# Patient Record
Sex: MALE | Race: WHITE | NOT HISPANIC OR LATINO | Employment: FULL TIME
[De-identification: names, ages, dates, MRNs, and addresses within clinical notes are randomized per-mention and may not be internally consistent; named-entity substitution may affect disease eponyms.]

---

## 2017-01-19 ENCOUNTER — RECORDS - HEALTHEAST (OUTPATIENT)
Dept: ADMINISTRATIVE | Facility: OTHER | Age: 57
End: 2017-01-19

## 2017-04-04 ENCOUNTER — COMMUNICATION - HEALTHEAST (OUTPATIENT)
Dept: INTERNAL MEDICINE | Facility: CLINIC | Age: 57
End: 2017-04-04

## 2017-04-04 DIAGNOSIS — K57.92 DIVERTICULITIS: ICD-10-CM

## 2017-04-05 ENCOUNTER — RECORDS - HEALTHEAST (OUTPATIENT)
Dept: ADMINISTRATIVE | Facility: OTHER | Age: 57
End: 2017-04-05

## 2017-04-18 ENCOUNTER — COMMUNICATION - HEALTHEAST (OUTPATIENT)
Dept: INTERNAL MEDICINE | Facility: CLINIC | Age: 57
End: 2017-04-18

## 2017-04-18 DIAGNOSIS — M85.672 OTHER CYST OF BONE, LEFT ANKLE AND FOOT: ICD-10-CM

## 2017-05-07 ENCOUNTER — COMMUNICATION - HEALTHEAST (OUTPATIENT)
Dept: INTERNAL MEDICINE | Facility: CLINIC | Age: 57
End: 2017-05-07

## 2017-05-07 DIAGNOSIS — I10 HYPERTENSION: ICD-10-CM

## 2017-05-09 ENCOUNTER — OFFICE VISIT - HEALTHEAST (OUTPATIENT)
Dept: PODIATRY | Age: 57
End: 2017-05-09

## 2017-05-09 DIAGNOSIS — M72.2 PLANTAR FASCIAL FIBROMATOSIS OF LEFT FOOT: ICD-10-CM

## 2017-07-07 ENCOUNTER — COMMUNICATION - HEALTHEAST (OUTPATIENT)
Dept: INTERNAL MEDICINE | Facility: CLINIC | Age: 57
End: 2017-07-07

## 2017-07-07 DIAGNOSIS — I10 HYPERTENSION: ICD-10-CM

## 2017-07-13 ENCOUNTER — COMMUNICATION - HEALTHEAST (OUTPATIENT)
Dept: INTERNAL MEDICINE | Facility: CLINIC | Age: 57
End: 2017-07-13

## 2017-07-13 DIAGNOSIS — I10 HYPERTENSION: ICD-10-CM

## 2018-03-07 ENCOUNTER — OFFICE VISIT - HEALTHEAST (OUTPATIENT)
Dept: INTERNAL MEDICINE | Facility: CLINIC | Age: 58
End: 2018-03-07

## 2018-03-07 DIAGNOSIS — M89.8X1 CHRONIC SCAPULAR PAIN: ICD-10-CM

## 2018-03-07 DIAGNOSIS — M25.511 RIGHT SHOULDER PAIN: ICD-10-CM

## 2018-03-07 DIAGNOSIS — G89.29 CHRONIC SCAPULAR PAIN: ICD-10-CM

## 2018-03-07 DIAGNOSIS — I10 ESSENTIAL HYPERTENSION: ICD-10-CM

## 2018-03-07 DIAGNOSIS — F41.1 ANXIETY STATE: ICD-10-CM

## 2018-03-07 ASSESSMENT — MIFFLIN-ST. JEOR: SCORE: 1787.51

## 2018-04-04 ENCOUNTER — RECORDS - HEALTHEAST (OUTPATIENT)
Dept: ADMINISTRATIVE | Facility: OTHER | Age: 58
End: 2018-04-04

## 2018-04-16 ENCOUNTER — COMMUNICATION - HEALTHEAST (OUTPATIENT)
Dept: INTERNAL MEDICINE | Facility: CLINIC | Age: 58
End: 2018-04-16

## 2018-04-17 ENCOUNTER — AMBULATORY - HEALTHEAST (OUTPATIENT)
Dept: INTERNAL MEDICINE | Facility: CLINIC | Age: 58
End: 2018-04-17

## 2018-04-18 ENCOUNTER — OFFICE VISIT - HEALTHEAST (OUTPATIENT)
Dept: INTERNAL MEDICINE | Facility: CLINIC | Age: 58
End: 2018-04-18

## 2018-04-18 DIAGNOSIS — M75.101 ROTATOR CUFF TEAR, RIGHT: ICD-10-CM

## 2018-04-18 DIAGNOSIS — Z01.818 PREOP EXAMINATION: ICD-10-CM

## 2018-04-18 DIAGNOSIS — I10 ESSENTIAL HYPERTENSION: ICD-10-CM

## 2018-04-18 LAB
ANION GAP SERPL CALCULATED.3IONS-SCNC: 12 MMOL/L (ref 5–18)
ATRIAL RATE - MUSE: 59 BPM
BUN SERPL-MCNC: 18 MG/DL (ref 8–22)
CALCIUM SERPL-MCNC: 9.9 MG/DL (ref 8.5–10.5)
CHLORIDE BLD-SCNC: 106 MMOL/L (ref 98–107)
CO2 SERPL-SCNC: 25 MMOL/L (ref 22–31)
CREAT SERPL-MCNC: 1.02 MG/DL (ref 0.7–1.3)
DIASTOLIC BLOOD PRESSURE - MUSE: NORMAL MMHG
ERYTHROCYTE [DISTWIDTH] IN BLOOD BY AUTOMATED COUNT: 12.7 % (ref 11–14.5)
GFR SERPL CREATININE-BSD FRML MDRD: >60 ML/MIN/1.73M2
GLUCOSE BLD-MCNC: 67 MG/DL (ref 70–125)
HCT VFR BLD AUTO: 42.7 % (ref 40–54)
HGB BLD-MCNC: 14.1 G/DL (ref 14–18)
INTERPRETATION ECG - MUSE: NORMAL
MCH RBC QN AUTO: 31 PG (ref 27–34)
MCHC RBC AUTO-ENTMCNC: 33.1 G/DL (ref 32–36)
MCV RBC AUTO: 94 FL (ref 80–100)
P AXIS - MUSE: 56 DEGREES
PLATELET # BLD AUTO: 247 THOU/UL (ref 140–440)
PMV BLD AUTO: 8.6 FL (ref 7–10)
POTASSIUM BLD-SCNC: 3.9 MMOL/L (ref 3.5–5)
PR INTERVAL - MUSE: 170 MS
QRS DURATION - MUSE: 102 MS
QT - MUSE: 406 MS
QTC - MUSE: 401 MS
R AXIS - MUSE: 14 DEGREES
RBC # BLD AUTO: 4.56 MILL/UL (ref 4.4–6.2)
SODIUM SERPL-SCNC: 143 MMOL/L (ref 136–145)
SYSTOLIC BLOOD PRESSURE - MUSE: NORMAL MMHG
T AXIS - MUSE: 3 DEGREES
VENTRICULAR RATE- MUSE: 59 BPM
WBC: 6.7 THOU/UL (ref 4–11)

## 2018-04-18 ASSESSMENT — MIFFLIN-ST. JEOR: SCORE: 1832.87

## 2018-04-20 ENCOUNTER — RECORDS - HEALTHEAST (OUTPATIENT)
Dept: ADMINISTRATIVE | Facility: OTHER | Age: 58
End: 2018-04-20

## 2018-04-26 ENCOUNTER — COMMUNICATION - HEALTHEAST (OUTPATIENT)
Dept: INTERNAL MEDICINE | Facility: CLINIC | Age: 58
End: 2018-04-26

## 2018-04-26 DIAGNOSIS — I10 HYPERTENSION: ICD-10-CM

## 2018-05-04 ENCOUNTER — RECORDS - HEALTHEAST (OUTPATIENT)
Dept: ADMINISTRATIVE | Facility: OTHER | Age: 58
End: 2018-05-04

## 2018-06-18 ENCOUNTER — COMMUNICATION - HEALTHEAST (OUTPATIENT)
Dept: INTERNAL MEDICINE | Facility: CLINIC | Age: 58
End: 2018-06-18

## 2018-06-18 DIAGNOSIS — I10 HYPERTENSION: ICD-10-CM

## 2018-07-13 ENCOUNTER — RECORDS - HEALTHEAST (OUTPATIENT)
Dept: ADMINISTRATIVE | Facility: OTHER | Age: 58
End: 2018-07-13

## 2018-08-24 ENCOUNTER — RECORDS - HEALTHEAST (OUTPATIENT)
Dept: ADMINISTRATIVE | Facility: OTHER | Age: 58
End: 2018-08-24

## 2018-09-12 ENCOUNTER — COMMUNICATION - HEALTHEAST (OUTPATIENT)
Dept: INTERNAL MEDICINE | Facility: CLINIC | Age: 58
End: 2018-09-12

## 2018-09-12 ENCOUNTER — AMBULATORY - HEALTHEAST (OUTPATIENT)
Dept: INTERNAL MEDICINE | Facility: CLINIC | Age: 58
End: 2018-09-12

## 2018-09-12 DIAGNOSIS — R14.0 ABDOMINAL BLOATING: ICD-10-CM

## 2018-09-12 DIAGNOSIS — R10.30 LOWER ABDOMINAL PAIN: ICD-10-CM

## 2018-09-14 ENCOUNTER — RECORDS - HEALTHEAST (OUTPATIENT)
Dept: ADMINISTRATIVE | Facility: OTHER | Age: 58
End: 2018-09-14

## 2018-09-17 ENCOUNTER — HOSPITAL ENCOUNTER (OUTPATIENT)
Dept: CT IMAGING | Facility: HOSPITAL | Age: 58
Discharge: HOME | End: 2018-09-17
Attending: INTERNAL MEDICINE

## 2018-09-17 DIAGNOSIS — R10.32 LLQ PAIN: ICD-10-CM

## 2018-09-28 ENCOUNTER — RECORDS - HEALTHEAST (OUTPATIENT)
Dept: ADMINISTRATIVE | Facility: OTHER | Age: 58
End: 2018-09-28

## 2018-10-18 ENCOUNTER — COMMUNICATION - HEALTHEAST (OUTPATIENT)
Dept: SCHEDULING | Facility: CLINIC | Age: 58
End: 2018-10-18

## 2018-10-18 ENCOUNTER — OFFICE VISIT - HEALTHEAST (OUTPATIENT)
Dept: INTERNAL MEDICINE | Facility: CLINIC | Age: 58
End: 2018-10-18

## 2018-10-18 DIAGNOSIS — K64.4 EXTERNAL HEMORRHOIDS: ICD-10-CM

## 2018-10-22 ENCOUNTER — COMMUNICATION - HEALTHEAST (OUTPATIENT)
Dept: INTERNAL MEDICINE | Facility: CLINIC | Age: 58
End: 2018-10-22

## 2018-12-12 ENCOUNTER — RECORDS - HEALTHEAST (OUTPATIENT)
Dept: ADMINISTRATIVE | Facility: OTHER | Age: 58
End: 2018-12-12

## 2019-01-10 ENCOUNTER — OFFICE VISIT - HEALTHEAST (OUTPATIENT)
Dept: INTERNAL MEDICINE | Facility: CLINIC | Age: 59
End: 2019-01-10

## 2019-01-10 ENCOUNTER — COMMUNICATION - HEALTHEAST (OUTPATIENT)
Dept: INTERNAL MEDICINE | Facility: CLINIC | Age: 59
End: 2019-01-10

## 2019-01-10 DIAGNOSIS — R50.9 FEVER, UNSPECIFIED FEVER CAUSE: ICD-10-CM

## 2019-01-10 DIAGNOSIS — J06.9 VIRAL URI: ICD-10-CM

## 2019-01-10 DIAGNOSIS — R05.9 COUGH: ICD-10-CM

## 2019-01-10 LAB
FLUAV AG SPEC QL IA: NORMAL
FLUBV AG SPEC QL IA: NORMAL

## 2019-01-10 ASSESSMENT — MIFFLIN-ST. JEOR: SCORE: 1787.51

## 2019-08-09 ENCOUNTER — RECORDS - HEALTHEAST (OUTPATIENT)
Dept: ADMINISTRATIVE | Facility: OTHER | Age: 59
End: 2019-08-09

## 2019-08-19 ENCOUNTER — COMMUNICATION - HEALTHEAST (OUTPATIENT)
Dept: INTERNAL MEDICINE | Facility: CLINIC | Age: 59
End: 2019-08-19

## 2019-08-19 ENCOUNTER — AMBULATORY - HEALTHEAST (OUTPATIENT)
Dept: INTERNAL MEDICINE | Facility: CLINIC | Age: 59
End: 2019-08-19

## 2019-08-20 ENCOUNTER — COMMUNICATION - HEALTHEAST (OUTPATIENT)
Dept: INTERNAL MEDICINE | Facility: CLINIC | Age: 59
End: 2019-08-20

## 2019-08-20 DIAGNOSIS — I10 HYPERTENSION: ICD-10-CM

## 2019-08-29 ENCOUNTER — COMMUNICATION - HEALTHEAST (OUTPATIENT)
Dept: SCHEDULING | Facility: CLINIC | Age: 59
End: 2019-08-29

## 2019-08-29 ENCOUNTER — COMMUNICATION - HEALTHEAST (OUTPATIENT)
Dept: INTERNAL MEDICINE | Facility: CLINIC | Age: 59
End: 2019-08-29

## 2019-08-29 ENCOUNTER — OFFICE VISIT - HEALTHEAST (OUTPATIENT)
Dept: INTERNAL MEDICINE | Facility: CLINIC | Age: 59
End: 2019-08-29

## 2019-08-29 DIAGNOSIS — S46.002A ROTATOR CUFF INJURY, LEFT, INITIAL ENCOUNTER: ICD-10-CM

## 2019-08-29 ASSESSMENT — MIFFLIN-ST. JEOR: SCORE: 1769.37

## 2019-09-10 ENCOUNTER — OFFICE VISIT - HEALTHEAST (OUTPATIENT)
Dept: INTERNAL MEDICINE | Facility: CLINIC | Age: 59
End: 2019-09-10

## 2019-09-10 DIAGNOSIS — Z01.818 PREOPERATIVE EXAMINATION: ICD-10-CM

## 2019-09-10 DIAGNOSIS — I10 ESSENTIAL HYPERTENSION: ICD-10-CM

## 2019-09-10 DIAGNOSIS — S46.012S TRAUMATIC INCOMPLETE TEAR OF LEFT ROTATOR CUFF, SEQUELA: ICD-10-CM

## 2019-09-10 LAB
ANION GAP SERPL CALCULATED.3IONS-SCNC: 10 MMOL/L (ref 5–18)
ATRIAL RATE - MUSE: 61 BPM
BUN SERPL-MCNC: 15 MG/DL (ref 8–22)
CALCIUM SERPL-MCNC: 10.1 MG/DL (ref 8.5–10.5)
CHLORIDE BLD-SCNC: 105 MMOL/L (ref 98–107)
CO2 SERPL-SCNC: 25 MMOL/L (ref 22–31)
CREAT SERPL-MCNC: 1.09 MG/DL (ref 0.7–1.3)
DIASTOLIC BLOOD PRESSURE - MUSE: NORMAL MMHG
ERYTHROCYTE [DISTWIDTH] IN BLOOD BY AUTOMATED COUNT: 13 % (ref 11–14.5)
GFR SERPL CREATININE-BSD FRML MDRD: >60 ML/MIN/1.73M2
GLUCOSE BLD-MCNC: 91 MG/DL (ref 70–125)
HCT VFR BLD AUTO: 46.4 % (ref 40–54)
HGB BLD-MCNC: 15.2 G/DL (ref 14–18)
INTERPRETATION ECG - MUSE: NORMAL
MCH RBC QN AUTO: 31.5 PG (ref 27–34)
MCHC RBC AUTO-ENTMCNC: 32.8 G/DL (ref 32–36)
MCV RBC AUTO: 96 FL (ref 80–100)
P AXIS - MUSE: 64 DEGREES
PLATELET # BLD AUTO: 201 THOU/UL (ref 140–440)
PMV BLD AUTO: 9 FL (ref 7–10)
POTASSIUM BLD-SCNC: 4.1 MMOL/L (ref 3.5–5)
PR INTERVAL - MUSE: 172 MS
QRS DURATION - MUSE: 94 MS
QT - MUSE: 388 MS
QTC - MUSE: 390 MS
R AXIS - MUSE: 10 DEGREES
RBC # BLD AUTO: 4.84 MILL/UL (ref 4.4–6.2)
SODIUM SERPL-SCNC: 140 MMOL/L (ref 136–145)
SYSTOLIC BLOOD PRESSURE - MUSE: NORMAL MMHG
T AXIS - MUSE: 0 DEGREES
VENTRICULAR RATE- MUSE: 61 BPM
WBC: 5.3 THOU/UL (ref 4–11)

## 2019-09-10 ASSESSMENT — MIFFLIN-ST. JEOR: SCORE: 1777.87

## 2019-09-17 ENCOUNTER — RECORDS - HEALTHEAST (OUTPATIENT)
Dept: ADMINISTRATIVE | Facility: OTHER | Age: 59
End: 2019-09-17

## 2019-10-02 ENCOUNTER — RECORDS - HEALTHEAST (OUTPATIENT)
Dept: ADMINISTRATIVE | Facility: OTHER | Age: 59
End: 2019-10-02

## 2019-11-05 ENCOUNTER — COMMUNICATION - HEALTHEAST (OUTPATIENT)
Dept: SCHEDULING | Facility: CLINIC | Age: 59
End: 2019-11-05

## 2019-11-05 ENCOUNTER — OFFICE VISIT - HEALTHEAST (OUTPATIENT)
Dept: INTERNAL MEDICINE | Facility: CLINIC | Age: 59
End: 2019-11-05

## 2019-11-05 DIAGNOSIS — R10.32 LEFT LOWER QUADRANT PAIN: ICD-10-CM

## 2019-11-05 DIAGNOSIS — K57.32 DIVERTICULITIS OF COLON: ICD-10-CM

## 2019-11-05 LAB
ERYTHROCYTE [DISTWIDTH] IN BLOOD BY AUTOMATED COUNT: 13.5 % (ref 11–14.5)
HCT VFR BLD AUTO: 41.8 % (ref 40–54)
HGB BLD-MCNC: 14.2 G/DL (ref 14–18)
MCH RBC QN AUTO: 31.4 PG (ref 27–34)
MCHC RBC AUTO-ENTMCNC: 34 G/DL (ref 32–36)
MCV RBC AUTO: 93 FL (ref 80–100)
PLATELET # BLD AUTO: 226 THOU/UL (ref 140–440)
PMV BLD AUTO: 8.7 FL (ref 7–10)
RBC # BLD AUTO: 4.52 MILL/UL (ref 4.4–6.2)
WBC: 10 THOU/UL (ref 4–11)

## 2019-11-05 ASSESSMENT — MIFFLIN-ST. JEOR: SCORE: 1782.41

## 2019-11-06 ENCOUNTER — RECORDS - HEALTHEAST (OUTPATIENT)
Dept: ADMINISTRATIVE | Facility: OTHER | Age: 59
End: 2019-11-06

## 2019-11-06 ENCOUNTER — COMMUNICATION - HEALTHEAST (OUTPATIENT)
Dept: INTERNAL MEDICINE | Facility: CLINIC | Age: 59
End: 2019-11-06

## 2019-11-13 ENCOUNTER — OFFICE VISIT - HEALTHEAST (OUTPATIENT)
Dept: INTERNAL MEDICINE | Facility: CLINIC | Age: 59
End: 2019-11-13

## 2019-11-13 DIAGNOSIS — F41.1 ANXIETY STATE: ICD-10-CM

## 2019-11-13 DIAGNOSIS — E78.2 MIXED HYPERLIPIDEMIA: ICD-10-CM

## 2019-11-13 DIAGNOSIS — Z00.00 ROUTINE GENERAL MEDICAL EXAMINATION AT A HEALTH CARE FACILITY: ICD-10-CM

## 2019-11-13 DIAGNOSIS — I10 ESSENTIAL HYPERTENSION: ICD-10-CM

## 2019-11-13 DIAGNOSIS — K57.32 DIVERTICULITIS OF COLON: ICD-10-CM

## 2019-11-13 DIAGNOSIS — Z98.890 S/P LEFT ROTATOR CUFF REPAIR: ICD-10-CM

## 2019-11-13 LAB
ALBUMIN SERPL-MCNC: 4.4 G/DL (ref 3.5–5)
ALBUMIN UR-MCNC: NEGATIVE MG/DL
ALP SERPL-CCNC: 75 U/L (ref 45–120)
ALT SERPL W P-5'-P-CCNC: 60 U/L (ref 0–45)
ANION GAP SERPL CALCULATED.3IONS-SCNC: 12 MMOL/L (ref 5–18)
APPEARANCE UR: CLEAR
AST SERPL W P-5'-P-CCNC: 30 U/L (ref 0–40)
BACTERIA #/AREA URNS HPF: ABNORMAL HPF
BILIRUB DIRECT SERPL-MCNC: 0.2 MG/DL
BILIRUB SERPL-MCNC: 0.6 MG/DL (ref 0–1)
BILIRUB UR QL STRIP: NEGATIVE
BUN SERPL-MCNC: 12 MG/DL (ref 8–22)
CALCIUM SERPL-MCNC: 10 MG/DL (ref 8.5–10.5)
CHLORIDE BLD-SCNC: 108 MMOL/L (ref 98–107)
CO2 SERPL-SCNC: 24 MMOL/L (ref 22–31)
COLOR UR AUTO: YELLOW
CREAT SERPL-MCNC: 1.02 MG/DL (ref 0.7–1.3)
ERYTHROCYTE [DISTWIDTH] IN BLOOD BY AUTOMATED COUNT: 12.8 % (ref 11–14.5)
GFR SERPL CREATININE-BSD FRML MDRD: >60 ML/MIN/1.73M2
GLUCOSE BLD-MCNC: 94 MG/DL (ref 70–125)
GLUCOSE UR STRIP-MCNC: NEGATIVE MG/DL
HCT VFR BLD AUTO: 44.5 % (ref 40–54)
HGB BLD-MCNC: 14.8 G/DL (ref 14–18)
HGB UR QL STRIP: ABNORMAL
KETONES UR STRIP-MCNC: NEGATIVE MG/DL
LEUKOCYTE ESTERASE UR QL STRIP: NEGATIVE
MCH RBC QN AUTO: 31.4 PG (ref 27–34)
MCHC RBC AUTO-ENTMCNC: 33.4 G/DL (ref 32–36)
MCV RBC AUTO: 94 FL (ref 80–100)
MUCOUS THREADS #/AREA URNS LPF: ABNORMAL LPF
NITRATE UR QL: NEGATIVE
PH UR STRIP: 5.5 [PH] (ref 5–8)
PLATELET # BLD AUTO: 301 THOU/UL (ref 140–440)
PMV BLD AUTO: 8.7 FL (ref 7–10)
POTASSIUM BLD-SCNC: 4 MMOL/L (ref 3.5–5)
PROT SERPL-MCNC: 7.6 G/DL (ref 6–8)
PSA SERPL-MCNC: 1.2 NG/ML (ref 0–3.5)
RBC # BLD AUTO: 4.72 MILL/UL (ref 4.4–6.2)
RBC #/AREA URNS AUTO: ABNORMAL HPF
SODIUM SERPL-SCNC: 144 MMOL/L (ref 136–145)
SP GR UR STRIP: 1.02 (ref 1–1.03)
SQUAMOUS #/AREA URNS AUTO: ABNORMAL LPF
UROBILINOGEN UR STRIP-ACNC: ABNORMAL
WBC #/AREA URNS AUTO: ABNORMAL HPF
WBC: 6.9 THOU/UL (ref 4–11)

## 2019-11-13 ASSESSMENT — MIFFLIN-ST. JEOR: SCORE: 1802.82

## 2019-11-18 ENCOUNTER — COMMUNICATION - HEALTHEAST (OUTPATIENT)
Dept: INTERNAL MEDICINE | Facility: CLINIC | Age: 59
End: 2019-11-18

## 2019-11-20 ENCOUNTER — AMBULATORY - HEALTHEAST (OUTPATIENT)
Dept: LAB | Facility: CLINIC | Age: 59
End: 2019-11-20

## 2019-11-20 ENCOUNTER — COMMUNICATION - HEALTHEAST (OUTPATIENT)
Dept: INTERNAL MEDICINE | Facility: CLINIC | Age: 59
End: 2019-11-20

## 2019-11-20 DIAGNOSIS — E78.2 MIXED HYPERLIPIDEMIA: ICD-10-CM

## 2019-11-20 LAB
CHOLEST SERPL-MCNC: 214 MG/DL
FASTING STATUS PATIENT QL REPORTED: YES
HDLC SERPL-MCNC: 53 MG/DL
LDLC SERPL CALC-MCNC: 141 MG/DL
TRIGL SERPL-MCNC: 98 MG/DL

## 2019-12-18 ENCOUNTER — RECORDS - HEALTHEAST (OUTPATIENT)
Dept: ADMINISTRATIVE | Facility: OTHER | Age: 59
End: 2019-12-18

## 2020-01-29 ENCOUNTER — RECORDS - HEALTHEAST (OUTPATIENT)
Dept: ADMINISTRATIVE | Facility: OTHER | Age: 60
End: 2020-01-29

## 2020-02-13 ENCOUNTER — AMBULATORY - HEALTHEAST (OUTPATIENT)
Dept: INTERNAL MEDICINE | Facility: CLINIC | Age: 60
End: 2020-02-13

## 2020-02-13 ENCOUNTER — COMMUNICATION - HEALTHEAST (OUTPATIENT)
Dept: LAB | Facility: CLINIC | Age: 60
End: 2020-02-13

## 2020-02-13 DIAGNOSIS — E78.5 HYPERLIPIDEMIA LDL GOAL <130: ICD-10-CM

## 2020-02-17 ENCOUNTER — AMBULATORY - HEALTHEAST (OUTPATIENT)
Dept: LAB | Facility: CLINIC | Age: 60
End: 2020-02-17

## 2020-02-17 ENCOUNTER — COMMUNICATION - HEALTHEAST (OUTPATIENT)
Dept: INTERNAL MEDICINE | Facility: CLINIC | Age: 60
End: 2020-02-17

## 2020-02-17 DIAGNOSIS — E78.5 HYPERLIPIDEMIA LDL GOAL <130: ICD-10-CM

## 2020-02-17 LAB
CHOLEST SERPL-MCNC: 225 MG/DL
FASTING STATUS PATIENT QL REPORTED: YES
HDLC SERPL-MCNC: 63 MG/DL
LDLC SERPL CALC-MCNC: 147 MG/DL
TRIGL SERPL-MCNC: 73 MG/DL

## 2020-05-06 ENCOUNTER — NURSE TRIAGE (OUTPATIENT)
Dept: NURSING | Facility: CLINIC | Age: 60
End: 2020-05-06

## 2020-05-06 DIAGNOSIS — Z20.828 EXPOSURE TO SARS-ASSOCIATED CORONAVIRUS: Primary | ICD-10-CM

## 2020-06-25 ENCOUNTER — COMMUNICATION - HEALTHEAST (OUTPATIENT)
Dept: SCHEDULING | Facility: CLINIC | Age: 60
End: 2020-06-25

## 2020-06-25 ENCOUNTER — OFFICE VISIT - HEALTHEAST (OUTPATIENT)
Dept: INTERNAL MEDICINE | Facility: CLINIC | Age: 60
End: 2020-06-25

## 2020-06-25 DIAGNOSIS — R14.0 ABDOMINAL BLOATING: ICD-10-CM

## 2020-06-25 DIAGNOSIS — K57.30 DIVERTICULAR DISEASE OF COLON: ICD-10-CM

## 2020-06-25 DIAGNOSIS — K59.00 CONSTIPATION, UNSPECIFIED CONSTIPATION TYPE: ICD-10-CM

## 2020-06-25 ASSESSMENT — MIFFLIN-ST. JEOR: SCORE: 1771.07

## 2020-07-16 ENCOUNTER — COMMUNICATION - HEALTHEAST (OUTPATIENT)
Dept: INTERNAL MEDICINE | Facility: CLINIC | Age: 60
End: 2020-07-16

## 2020-07-16 DIAGNOSIS — I10 HYPERTENSION: ICD-10-CM

## 2021-04-06 ENCOUNTER — COMMUNICATION - HEALTHEAST (OUTPATIENT)
Dept: INTERNAL MEDICINE | Facility: CLINIC | Age: 61
End: 2021-04-06

## 2021-04-06 DIAGNOSIS — I10 HYPERTENSION: ICD-10-CM

## 2021-05-20 ENCOUNTER — RECORDS - HEALTHEAST (OUTPATIENT)
Dept: ADMINISTRATIVE | Facility: OTHER | Age: 61
End: 2021-05-20

## 2021-05-20 ENCOUNTER — OFFICE VISIT - HEALTHEAST (OUTPATIENT)
Dept: INTERNAL MEDICINE | Facility: CLINIC | Age: 61
End: 2021-05-20

## 2021-05-20 DIAGNOSIS — E78.2 MIXED HYPERLIPIDEMIA: ICD-10-CM

## 2021-05-20 DIAGNOSIS — I10 ESSENTIAL HYPERTENSION: ICD-10-CM

## 2021-05-20 RX ORDER — AMLODIPINE BESYLATE 10 MG/1
10 TABLET ORAL DAILY
Qty: 90 TABLET | Refills: 3 | Status: SHIPPED | OUTPATIENT
Start: 2021-05-20

## 2021-05-20 ASSESSMENT — MIFFLIN-ST. JEOR: SCORE: 1775.61

## 2021-05-24 ENCOUNTER — RECORDS - HEALTHEAST (OUTPATIENT)
Dept: ADMINISTRATIVE | Facility: CLINIC | Age: 61
End: 2021-05-24

## 2021-05-27 VITALS — OXYGEN SATURATION: 97 % | DIASTOLIC BLOOD PRESSURE: 62 MMHG | HEART RATE: 68 BPM | SYSTOLIC BLOOD PRESSURE: 124 MMHG

## 2021-05-27 VITALS — BODY MASS INDEX: 24.25 KG/M2 | WEIGHT: 194 LBS

## 2021-05-27 VITALS — HEIGHT: 75 IN

## 2021-05-28 ENCOUNTER — RECORDS - HEALTHEAST (OUTPATIENT)
Dept: ADMINISTRATIVE | Facility: CLINIC | Age: 61
End: 2021-05-28

## 2021-05-29 ENCOUNTER — RECORDS - HEALTHEAST (OUTPATIENT)
Dept: ADMINISTRATIVE | Facility: CLINIC | Age: 61
End: 2021-05-29

## 2021-05-31 VITALS — BODY MASS INDEX: 25.8 KG/M2 | WEIGHT: 201 LBS | HEIGHT: 74 IN

## 2021-05-31 NOTE — TELEPHONE ENCOUNTER
Patient Returning Call  Reason for call:  MRI  Information relayed to patient:  Please cancel request for MRI.  [facility] is going to perform the imaging on August 26 .  No further action needed.   Patient has additional questions:  No  If YES, what are your questions/concerns:  NA  Okay to leave a detailed message?: No call back needed

## 2021-05-31 NOTE — TELEPHONE ENCOUNTER
Refill Approved    Rx renewed per Medication Renewal Policy. Medication was last renewed on 6/19/18.    Soraya Diamond, [facility] Triage/Med Refill 8/21/2019     Requested Prescriptions   Pending Prescriptions Disp Refills     amLODIPine (NORVASC) 10 MG tablet [Pharmacy Med Name: AMLODIPINE TAB 10MG] 90 tablet 1     Sig: TAKE 1 TABLET BY MOUTH ONCE A DAY       Calcium-Channel Blockers Protocol Passed - 8/20/2019 12:22 PM        Passed - PCP or prescribing provider visit in past 12 months or next 3 months     Last office visit with prescriber/PCP: 9/2/2016 Andrew Shay MD OR same dept: 1/10/2019 Manan Moreno MD OR same specialty: 1/10/2019 Manan Moreno MD  Last physical: Visit date not found Last MTM visit: Visit date not found   Next visit within 3 mo: Visit date not found  Next physical within 3 mo: Visit date not found  Prescriber OR PCP: Andrew Shay MD  Last diagnosis associated with med order: 1. Hypertension  - amLODIPine (NORVASC) 10 MG tablet [Pharmacy Med Name: AMLODIPINE TAB 10MG]; TAKE 1 TABLET BY MOUTH ONCE A DAY  Dispense: 90 tablet; Refill: 1    If protocol passes may refill for 12 months if within 3 months of last provider visit (or a total of 15 months).             Passed - Blood pressure filed in past 12 months     BP Readings from Last 1 Encounters:   01/10/19 132/70

## 2021-05-31 NOTE — TELEPHONE ENCOUNTER
Left message to call back for: Storm  Information to relay to patient:  Please ask the patient which shoulder he injured.  We will then place the order for the covering provider to review.  Thank you.  Andra WEEMS CMA/KATY....................4:03 PM

## 2021-05-31 NOTE — PROGRESS NOTES
"Office Visit - Follow up    Storm Power   59 y.o. male    Date of Visit: 8/29/2019    Chief Complaint   Patient presents with     Arm Pain     Cortisone shot x2 weeks ago-- worn off. Left rotator cuff pain has been going on for months per pt. Surgery set for 9/17/19 at [facility]      work note     8/29-9/16 for work        Subjective: Patient is here solely for documentation regarding his ability to work because of left rotator cuff injury    Has had ongoing pain in the left shoulder that he indicates dates back to last fall.  Was seen in [facility].  A intra-articular steroid injection was modestly helpful however symptoms have recurred.  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  He has been told by orthopedic surgeon that he is unable to lift.    Left rotator cuff repair surgery is planned for September 19.    ROS: A comprehensive review of systems was performed and was otherwise negative except as mentioned above.     Exam  Brief exam reveals limited range of motion of left shoulder without gross deformity   /82 (Patient Site: Left Arm, Patient Position: Sitting, Cuff Size: Adult Regular)   Pulse 64   Resp 16   Ht 6' 1.75\" (1.873 m)   Wt 197 lb (89.4 kg)   SpO2 98%   BMI 25.47 kg/m      Assessment and Plan  Left rotator cuff injury.  I have provided adequate documentation that he is unable to work from today until his surgical time for further disability determinations should be managed by his primary physician or orthopedic surgery    Storm was seen today for arm pain and work note.    Diagnoses and all orders for this visit:    Rotator cuff injury, left, initial encounter          Time: total time spent with the patient was 15 minutes of which >50% was spent in counseling and coordination of care        Allergies   Allergen Reactions     Ciprofloxacin        Medications :  Prior to Admission medications    Medication Sig Start Date End Date Taking? Authorizing " Provider   amLODIPine (NORVASC) 10 MG tablet Take 1 tablet (10 mg total) by mouth daily. 4/26/18  Yes Manan Moreno MD   ASPIRIN LOW DOSE ORAL Take 81 mg by mouth daily.   Yes PROVIDER, HISTORICAL   amLODIPine (NORVASC) 10 MG tablet TAKE 1 TABLET BY MOUTH ONCE A DAY 6/19/18   Andrew Shay MD   amLODIPine (NORVASC) 10 MG tablet TAKE 1 TABLET BY MOUTH ONCE A DAY 8/21/19   Andrew Shay MD        Past Medical History:   Past Medical History:   Diagnosis Date     Hypertension        Past Surgical History: No past surgical history on file.    Social History:   Social History     Socioeconomic History     Marital status:      Spouse name: Not on file     Number of children: Not on file     Years of education: Not on file     Highest education level: Not on file   Occupational History     Not on file   Social Needs     Financial resource strain: Not on file     Food insecurity:     Worry: Not on file     Inability: Not on file     Transportation needs:     Medical: Not on file     Non-medical: Not on file   Tobacco Use     Smoking status: Current Some Day Smoker     Packs/day: 0.01     Types: Cigars     Smokeless tobacco: Never Used     Tobacco comment: Socially, but does not purchase them   Substance and Sexual Activity     Alcohol use: Yes     Comment: occasional wine and beer     Drug use: No     Sexual activity: Not on file   Lifestyle     Physical activity:     Days per week: Not on file     Minutes per session: Not on file     Stress: Not on file   Relationships     Social connections:     Talks on phone: Not on file     Gets together: Not on file     Attends Church service: Not on file     Active member of club or organization: Not on file     Attends meetings of clubs or organizations: Not on file     Relationship status: Not on file     Intimate partner violence:     Fear of current or ex partner: Not on file     Emotionally abused: Not on file     Physically abused: Not on file     Forced  sexual activity: Not on file   Other Topics Concern     Not on file   Social History Narrative    , 2 grown children, a daughter and a son. Smokes 1-3 cigars a week. Drinks 5 beers a week. Works for [employer].       Family History: No family history on file.      Anatoliy Wan MD

## 2021-05-31 NOTE — TELEPHONE ENCOUNTER
Who is calling:  Patient   Reason for Call:  Patient states that he received Cortisone shot for shoulder pain  Its not helping patient would like schedule MRI . Please call patient for further questions.  Date of last appointment with primary care: 1/10/19  Okay to leave a detailed message: No

## 2021-05-31 NOTE — TELEPHONE ENCOUNTER
RN triage   Call from pt   Pt states he has L shoulder and neck pain and tingling off and on to L  fingers   Having L rotator cuff surgery 8/17   Pt states he did not go to work today -- will not go tomorrow and needs work note   Pt states he will need a pre op physical also  Reviewed home care advice   Per protocol = should be seen  Transferred to    Clarissa Collins RN [facility] RN triage    Reason for Disposition    Numbness (i.e., loss of sensation) in hand or fingers    Protocols used: SHOULDER PAIN-A-OH

## 2021-05-31 NOTE — TELEPHONE ENCOUNTER
Patient Returning Call  Reason for call:  The patient is returning the call.  Information relayed to patient:  Below message has been relayed to the patient. The patient injured his left shoulder.   Patient has additional questions:  No  If YES, what are your questions/concerns:  NA  Okay to leave a detailed message?: Yes

## 2021-06-01 ENCOUNTER — RECORDS - HEALTHEAST (OUTPATIENT)
Dept: ADMINISTRATIVE | Facility: CLINIC | Age: 61
End: 2021-06-01

## 2021-06-01 VITALS — WEIGHT: 211 LBS | BODY MASS INDEX: 27.08 KG/M2 | HEIGHT: 74 IN

## 2021-06-01 VITALS — WEIGHT: 160 LBS | BODY MASS INDEX: 20.68 KG/M2

## 2021-06-01 NOTE — PROGRESS NOTES
Preoperative Exam    Scheduled Procedure: L Rotator Cuff Repair  Surgery Date:  9/17/19  Surgery Location: [facility], fax [phone]    Surgeon:  Dr. Rhoades    Assessment/Plan:     1. Preoperative examination  Has normal physical exam.  Okay to go ahead with his arthroscopic left rotator cuff repair.  No contraindication.  - Electrocardiogram Perform and Read    2. Traumatic incomplete tear of left rotator cuff, sequela  Has incomplete tear of the left rotator cuff causing left shoulder pains.  Unable to do his job because his inability to carry or lift heavy objects.  After lifting he gets significant left shoulder pains.  Had endoscopic right rotator cuff repair in April 2018 without complications.    3. Essential hypertension  Controlled. Will take his amlodipine with sips of water in the morning of surgery.  - HM2(CBC w/o Differential)  - Basic Metabolic Panel     Surgical Procedure Risk: Low (reported cardiac risk generally < 1%)  Have you had prior anesthesia?: Yes  Have you or any family members had a previous anesthesia reaction:  No  Do you or any family members have a history of a clotting or bleeding disorder?: No  Cardiac Risk Assessment: no increased risk for major cardiac complications    APPROVAL GIVEN to proceed with proposed procedure, without further diagnostic evaluation    Functional Status: Independent  Patient plans to recover at home with family.     Subjective:      Storm Power is a 59 y.o. male who presents for a preoperative consultation.      59-year-old male for his preoperative history and physical requested by his orthopedic surgeon Dr. Rhoades.  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  Pains are getting severe.  Did not respond to conservative treatment like cortisone injection to the left shoulder.  Had similar problem a year ago on his right shoulder and had arthroscopic  right rotator cuff repair for a torn right rotator cuff.  Was advised by his orthopedic surgeon to  have similar arthroscopic repair of a torn left rotator cuff.  Has hypertension controlled by amlodipine.  Stopped taking aspirin 6 months ago.  Denies chest pain shortness of breath.  Denies urinary and bowel symptoms.  Denies dizziness and lightheadedness.  Overall, feels well.    All other systems reviewed and are negative, other than those listed in the HPI.    Pertinent History  Do you have difficulty breathing or chest pain after walking up a flight of stairs: No  History of obstructive sleep apnea: No  Steroid use in the last 6 months: No  Frequent Aspirin/NSAID use: No  Prior Blood Transfusion: No  Prior Blood Transfusion Reaction: No  If for some reason prior to, during or after the procedure, if it is medically indicated, would you be willing to have a blood transfusion?:  There is no transfusion refusal.    Current Outpatient Medications   Medication Sig Dispense Refill     amLODIPine (NORVASC) 10 MG tablet Take 1 tablet (10 mg total) by mouth daily. 90 tablet 3     ASPIRIN LOW DOSE ORAL Take 81 mg by mouth daily.       No current facility-administered medications for this visit.         Allergies   Allergen Reactions     Ciprofloxacin        Patient Active Problem List   Diagnosis     Anxiety     Hyperlipidemia     Essential Hypertension       Past Medical History:   Diagnosis Date     Hypertension        No past surgical history on file.    Social History     Socioeconomic History     Marital status:      Spouse name: Not on file     Number of children: Not on file     Years of education: Not on file     Highest education level: Not on file   Occupational History     Not on file   Social Needs     Financial resource strain: Not on file     Food insecurity:     Worry: Not on file     Inability: Not on file     Transportation needs:     Medical: Not on file     Non-medical: Not on file   Tobacco Use  "    Smoking status: Current Some Day Smoker     Packs/day: 0.01     Types: Cigars     Smokeless tobacco: Never Used     Tobacco comment: Socially, but does not purchase them   Substance and Sexual Activity     Alcohol use: Yes     Comment: occasional wine and beer     Drug use: No     Sexual activity: Not on file   Lifestyle     Physical activity:     Days per week: Not on file     Minutes per session: Not on file     Stress: Not on file   Relationships     Social connections:     Talks on phone: Not on file     Gets together: Not on file     Attends Hoahaoism service: Not on file     Active member of club or organization: Not on file     Attends meetings of clubs or organizations: Not on file     Relationship status: Not on file     Intimate partner violence:     Fear of current or ex partner: Not on file     Emotionally abused: Not on file     Physically abused: Not on file     Forced sexual activity: Not on file   Other Topics Concern     Not on file   Social History Narrative    , 2 grown children, a daughter and a son. Smokes 1-3 cigars a week. Drinks 5 beers a week. Works for [employer].       Patient Care Team:  Manan Moreno MD as PCP - General  Manan Moreno MD as Assigned PCP          Objective:     Vitals:    09/10/19 1502   BP: 134/70   Pulse: 71   SpO2: 98%   Weight: 198 lb (89.8 kg)   Height: 6' 2\" (1.88 m)         Physical Exam:    General Appearance:    Alert, cooperative, no distress, appears stated age, pleasant   Head:    Normocephalic, without obvious abnormality, atraumatic   Eyes:    PERRL, conjunctiva/corneas clear, EOM's intact, fundi     benign, both eyes        Ears:    Normal TM's and external ear canals, both ears   Nose:   Nares normal, septum midline, mucosa normal, no drainage    or sinus tenderness   Throat:   Lips, mucosa, and tongue normal; teeth and gums normal   Neck:   Supple, symmetrical, trachea midline, no adenopathy;        thyroid:  No " enlargement/tenderness/nodules; no carotid    bruit or JVD   Back:     Symmetric, no curvature, ROM normal, no CVA tenderness   Lungs:     Clear to auscultation bilaterally, respirations unlabored   Chest wall:    No tenderness or deformity   Heart:    Regular rate and rhythm, S1 and S2 normal, no murmur, rub    or gallop   Abdomen:     Soft, non-tender, bowel sounds active all four quadrants,     no masses, no organomegaly   Extremities:   Extremities normal, atraumatic, no cyanosis or edema   Pulses:   2+ and symmetric all extremities   Skin:   Skin color, texture, turgor normal, no rashes or lesions   Lymph nodes:   Cervical, supraclavicular, and axillary nodes normal   Neurologic:    Musculoskeletal:   CNII-XII intact. Normal strength, sensation and reflexes       throughout    Tender left shoulder with difficulty doing range of motion    especially abduction       There are no Patient Instructions on file for this visit.    EKG: Normal EKG, 9/10/2019    Labs:  Labs pending at this time.  Results will be reviewed when available.      There is no immunization history on file for this patient.        Electronically signed by Manan Moreno MD 09/10/19 3:04 PM

## 2021-06-02 VITALS — BODY MASS INDEX: 25.8 KG/M2 | HEIGHT: 74 IN | WEIGHT: 201 LBS

## 2021-06-03 VITALS — BODY MASS INDEX: 25.28 KG/M2 | WEIGHT: 197 LBS | HEIGHT: 74 IN

## 2021-06-03 VITALS
DIASTOLIC BLOOD PRESSURE: 80 MMHG | OXYGEN SATURATION: 96 % | HEART RATE: 79 BPM | SYSTOLIC BLOOD PRESSURE: 118 MMHG | WEIGHT: 200 LBS | HEIGHT: 75 IN | BODY MASS INDEX: 24.87 KG/M2 | TEMPERATURE: 98.7 F

## 2021-06-03 VITALS
HEIGHT: 74 IN | OXYGEN SATURATION: 98 % | WEIGHT: 198 LBS | SYSTOLIC BLOOD PRESSURE: 134 MMHG | BODY MASS INDEX: 25.41 KG/M2 | HEART RATE: 71 BPM | DIASTOLIC BLOOD PRESSURE: 70 MMHG

## 2021-06-03 VITALS
DIASTOLIC BLOOD PRESSURE: 76 MMHG | OXYGEN SATURATION: 98 % | SYSTOLIC BLOOD PRESSURE: 104 MMHG | BODY MASS INDEX: 25.54 KG/M2 | HEIGHT: 74 IN | HEART RATE: 66 BPM | TEMPERATURE: 98.5 F | WEIGHT: 199 LBS

## 2021-06-03 NOTE — PROGRESS NOTES
Office Visit - Physical Exam   Storm Power   59 y.o. male    Date of Visit: 11/13/2019    Chief Complaint   Patient presents with     Annual Exam     not fasting, needs repeat WBC and abdominal pain has slightly subsided, but has diarrhea and side effects of med, has 3 days left of antibiotic         Assessment and Plan   1. Routine general medical examination at a health care facility  Advised his comprehensive physical exam is normal.  Check PSA.  - PSA (Prostatic-Specific Antigen), Annual Screen    2. Diverticulitis of colon  Complained of left lower quadrant pains started more than a week ago.  Was seen for this a week ago.  Was concerned with recurrence of diverticulitis because had the same complaint 1 year ago.  Was empirically treated with Augmentin 3 times a day for 10 days.  Finished 7 days of treatment of his abdominal pains resolved.  Only getting side effects  of this antibiotic which cause some diarrhea, gaseousness and midepigastric discomfort.  Advised to cut down his Augmentin  to 2 times a day and to finish 10-day treatment of this antibiotic.  Check CBC.  Had normal CBC specifically his white cell count on his last visit.  - HM2(CBC w/o Differential)    3. Essential hypertension  Controlled.  Continue amlodipine.  Check basic metabolic panel liver function urinalysis.  - Basic Metabolic Panel  - Hepatic Profile  - Urinalysis-UC if Indicated    4. Mixed hyperlipidemia  Has diet-controlled hyperlipidemia.  Not fasting today.  Will come back next week or the week after for fasting  lipids check only.  Will write a future order for this.    5. Anxiety  Gets anxious readily.  But seems to be getting better.  Does not take antianxiety medication.    6. S/P left rotator cuff repair, 9/17/19  Status post recent left rotator cuff repair for torn rotator cuff.  Followed by orthopedics.  Was seen a few days ago.  Was advised to follow-up in 4 weeks.  Continue with his physical therapy of his left  shoulder.  Still off work  since he is unable to lift, push and pull using his left upper extremity.  Clearance to go back to work will come from his orthopedic surgeon.      Follow up in 6 months.     History of Present Illness   This 59 y.o. old male is here for his comprehensive physical exam as well as for follow-up of a bout of his diverticulitis.  Was seen a week ago for left lower quadrant pains.  Concern had recurrence of diverticulitis because he had the same complaint in September 2018.  Was treated  with Augmentin.  His left lower quadrant pains resolved.  Only experiences some side effects of Augmentin consisting of diarrhea, gaseousness and midepigastric discomfort.  He still has left lower shoulder pains from recent left rotator cuff repair on 9/17/2019.  Saw orthopedics for post surgery follow-up.  Was advised to start physical therapy which he is now doing 2 times a week.  Still unable to report to work until he gets clearance from orthopedics that he can go back to work.  Has hypertension controlled by amlodipine alone.  Has mixed hyperlipemia but diet controlled.  Last lipids were good.  Sees and hears well.  Denies heartburn.  Denies chest pains and shortness of breath.  Denies urinary symptoms.  Sleeps well.  Sexually active.  Overall, feels well.    Review of Systems   A 12 point comprehensive review of systems was negative except as noted..     Medications, Allergies and Problem List   Reviewed and updated             Chief Complaint   Annual Exam (not fasting, needs repeat WBC and abdominal pain has slightly subsided, but has diarrhea and side effects of med, has 3 days left of antibiotic )       Patient Profile   Social History     Social History Narrative    , 2 grown children, a daughter and a son. Smokes 1-3 cigars a week. Drinks glass or 2 of beer or wine 3 times a week. Works for [employer].        Past Medical History   Patient Active Problem List   Diagnosis     Anxiety      "Hyperlipidemia     Essential Hypertension     S/P left rotator cuff repair, 9/17/19       Past Surgical History  He has no past surgical history on file.       Medications and Allergies   Current Outpatient Medications   Medication Sig     amLODIPine (NORVASC) 10 MG tablet Take 1 tablet (10 mg total) by mouth daily.     amoxicillin-clavulanate (AUGMENTIN) 875-125 mg per tablet Take 1 tablet by mouth 3 (three) times a day for 10 days.     ASPIRIN LOW DOSE ORAL Take 81 mg by mouth daily.     Allergies   Allergen Reactions     Ciprofloxacin         Family and Social History   No family history on file.     Social History     Tobacco Use     Smoking status: Current Some Day Smoker     Packs/day: 0.01     Types: Cigars     Smokeless tobacco: Never Used     Tobacco comment: Socially, but does not purchase them   Substance Use Topics     Alcohol use: Yes     Comment: occasional wine and beer     Drug use: No        Physical Exam       Physical Exam  /80   Pulse 79   Temp 98.7  F (37.1  C) (Oral)   Ht 6' 3\" (1.905 m)   Wt 200 lb (90.7 kg)   SpO2 96%   BMI 25.00 kg/m        General Appearance:    Alert, cooperative, no distress, appears stated age, pleasant   Head:    Normocephalic, without obvious abnormality, atraumatic   Eyes:    PERRL, conjunctiva/corneas clear, EOM's intact, fundi     benign, both eyes        Ears:    Normal TM's and external ear canals, both ears   Nose:   Nares normal, septum midline, mucosa normal, no drainage    or sinus tenderness   Throat:   Lips, mucosa, and tongue normal; teeth and gums normal   Neck:   Supple, symmetrical, trachea midline, no adenopathy;        thyroid:  No enlargement/tenderness/nodules; no carotid    bruit or JVD   Back:     Symmetric, no curvature, ROM normal, no CVA tenderness   Lungs:     Clear to auscultation bilaterally, respirations unlabored   Chest wall:    No tenderness or deformity   Heart:    Regular rate and rhythm, S1 and S2 normal, no murmur, rub   " or gallop   Abdomen:     Soft, non-tender, bowel sounds active all four quadrants,     no masses, no organomegaly   Genitalia:    Normal male without lesion, discharge or tenderness,    circumcised   Rectal:    Normal tone, normal prostate, no masses or tenderness   Extremities:   Extremities normal, atraumatic, no cyanosis or edema   Pulses:   2+ and symmetric all extremities   Skin:   Skin color, texture, turgor normal, no rashes or lesions   Lymph nodes:   Cervical, supraclavicular, and axillary nodes normal   Neurologic:    Musculoskeletal:   CNII-XII intact. Normal strength, sensation and reflexes       throughout    Left shoulder still has limited range of motion and tender to    palpation.  But surgical incision site is now well-healed        Additional Information        Manan Moreno MD  Internal Medicine  Contact me at [phone]     Additional Information   Current Outpatient Medications   Medication Sig     amLODIPine (NORVASC) 10 MG tablet Take 1 tablet (10 mg total) by mouth daily.     amoxicillin-clavulanate (AUGMENTIN) 875-125 mg per tablet Take 1 tablet by mouth 3 (three) times a day for 10 days.     ASPIRIN LOW DOSE ORAL Take 81 mg by mouth daily.     Allergies   Allergen Reactions     Ciprofloxacin      Social History     Tobacco Use     Smoking status: Current Some Day Smoker     Packs/day: 0.01     Types: Cigars     Smokeless tobacco: Never Used     Tobacco comment: Socially, but does not purchase them   Substance Use Topics     Alcohol use: Yes     Comment: occasional wine and beer     Drug use: No

## 2021-06-03 NOTE — PROGRESS NOTES
"  Office Visit - Follow Up   Storm Power   59 y.o. male    Date of Visit: 11/5/2019    Chief Complaint   Patient presents with     Abdominal Pain     x1 week, lower abdominal pain, unable to pass gas, and stomach \"rumbling\", nausea and no appetite        Assessment and Plan   1. Left lower quadrant pain  Complains of left lower quadrant pains moderate in intensity since 2 days ago.  Accompanied by his poor appetite, some nausea and inability to pass gas.  Denies fever.  Concerned with recurrence of diverticulitis.    2. Diverticulitis of colon  Had last diverticulitis recurrence in September 2018 for which he had same complaints of left lower quadrant pains.  CT of the abdomen showed extensive diverticulosis with inflammatory changes at the junction of the sigmoid and left colon.  Was treated with Augmentin which resolved his diverticulitis.  Cannot tolerate ciprofloxacin.  This was followed by colonoscopy in December 2018 showing extensive sigmoid diverticulosis and 3 polyps which were removed.  Agree with the patient, he has recurrence of his diverticulitis..  Will treat with Augmentin 875/125 mg 1 tablet 3 times a day for 10 days.  Check CBC.  Advised to see me for follow-up in 1 week.  - HM2(CBC w/o Differential)  - amoxicillin-clavulanate (AUGMENTIN) 875-125 mg per tablet; Take 1 tablet by mouth 3 (three) times a day for 10 days.  Dispense: 30 tablet; Refill: 0      Follow up in 1 week.     History of Present Illness   This 59 y.o. old male is complains of recurrence of left lower quadrant pains.  Also complains of poor appetite, some nausea, and inability to pass gas.  But denies fever.  Had the same problems in September 2018.  Was assessed he had diverticulitis.  Had a CT of his abdomen that time showing extensive diverticulosis with inflammatory changes at the junction of the sigmoid and left colon.  Was treated with Augmentin.  Had follow-up colonoscopy which showed extensive diverticulosis and 3 polyps " "were also removed.  Overall, feels well.    Review of Systems   A 12 point comprehensive review of systems was negative except as noted..     Medications, Allergies and Problem List   Reviewed and updated             Chief Complaint   Abdominal Pain (x1 week, lower abdominal pain, unable to pass gas, and stomach \"rumbling\", nausea and no appetite)       Patient Profile   Social History     Patient does not qualify to have social determinant information on file (likely too young).   Social History Narrative    , 2 grown children, a daughter and a son. Smokes 1-3 cigars a week. Drinks 5 beers a week. Works for [employer].        Past Medical History   Patient Active Problem List   Diagnosis     Anxiety     Hyperlipidemia     Essential Hypertension       Past Surgical History  He has no past surgical history on file.       Medications and Allergies   Current Outpatient Medications   Medication Sig     amLODIPine (NORVASC) 10 MG tablet Take 1 tablet (10 mg total) by mouth daily.     ASPIRIN LOW DOSE ORAL Take 81 mg by mouth daily.     amoxicillin-clavulanate (AUGMENTIN) 875-125 mg per tablet Take 1 tablet by mouth 3 (three) times a day for 10 days.     Allergies   Allergen Reactions     Ciprofloxacin         Family and Social History   No family history on file.     Social History     Tobacco Use     Smoking status: Current Some Day Smoker     Packs/day: 0.01     Types: Cigars     Smokeless tobacco: Never Used     Tobacco comment: Socially, but does not purchase them   Substance Use Topics     Alcohol use: Yes     Comment: occasional wine and beer     Drug use: No        Physical Exam       Physical Exam  /76   Pulse 66   Temp 98.5  F (36.9  C) (Oral)   Ht 6' 2\" (1.88 m)   Wt 199 lb (90.3 kg)   SpO2 98%   BMI 25.55 kg/m    General appearance: alert, appears stated age, cooperative and no distress  Head: Normocephalic, without obvious abnormality, atraumatic  Throat: lips, mucosa, and tongue normal; " teeth and gums normal  Neck: no adenopathy, no carotid bruit, no JVD, supple, symmetrical, trachea midline and thyroid not enlarged, symmetric, no tenderness/mass/nodules  Lungs: clear to auscultation bilaterally  Heart: regular rate and rhythm, S1, S2 normal, no murmur, click, rub or gallop  Abdomen: Soft, tender lower left lower quadrant, normal bowel sounds, no rebound  Extremities: extremities normal, atraumatic, no cyanosis or edema     Additional Information        Manan Moreno MD  Internal Medicine  Contact me at [phone]     Additional Information   Current Outpatient Medications   Medication Sig     amLODIPine (NORVASC) 10 MG tablet Take 1 tablet (10 mg total) by mouth daily.     ASPIRIN LOW DOSE ORAL Take 81 mg by mouth daily.     amoxicillin-clavulanate (AUGMENTIN) 875-125 mg per tablet Take 1 tablet by mouth 3 (three) times a day for 10 days.     Allergies   Allergen Reactions     Ciprofloxacin      Social History     Tobacco Use     Smoking status: Current Some Day Smoker     Packs/day: 0.01     Types: Cigars     Smokeless tobacco: Never Used     Tobacco comment: Socially, but does not purchase them   Substance Use Topics     Alcohol use: Yes     Comment: occasional wine and beer     Drug use: No         Time: total time spent with the patient was 25 minutes of which >50% was spent in counseling and coordination of care

## 2021-06-03 NOTE — TELEPHONE ENCOUNTER
"RN Triage:     Patient is calling in stating that he thinks he is having a a diverticulitis flare. He reports he has had 3 flares in 5 years.HIs symptoms are \"lower intestines inflamed\" and \"trouble passing gas and has a lot of gas\". Lower left abdomen soreness. Rates pain a 3/10 today. Yesterday a 7-8/10. NO vomiting.  BM was dark brown this morning. No known fever. Able to walk. Patient requesting an appointment to be seen today.   Phyllis Andino RN, BSN Care Connection Triage Nurse    Reason for Disposition    Patient wants to be seen    Protocols used: ABDOMINAL PAIN - MALE-A-[place]      "

## 2021-06-06 NOTE — TELEPHONE ENCOUNTER
Letter to patient- Increased lipids specifically LDL and total cholesterol than previously.  Will treat with low-fat diet or low-cholesterol diet first and have your  fasting lipids repeated in 3 months as lab appointment.  If Lipids are still increased you will need a lipid-lowering medication.    Please place lab orders for patient. Thank you  Cyndi Jones CSS

## 2021-06-06 NOTE — TELEPHONE ENCOUNTER
Patient has lab appointment on 2/17 for a cholesterol check.  We will need orders for this.  Thank you.

## 2021-06-09 NOTE — TELEPHONE ENCOUNTER
Storm feels that he has an ulcer.  Storm has revised food intake and is having gas building constantly.  Storm denies heartburn.  Denies fever cough and shortness of breath.  Slight nausea.      COVID 19 Nurse Triage Plan/Patient Instructions    Please be aware that novel coronavirus (COVID-19) may be circulating in the community. If you develop symptoms such as fever, cough, or SOB or if you have concerns about the presence of another infection including coronavirus (COVID-19), please contact your health care provider or visit [email or web address].     Disposition/Instructions    Patient to schedule an In Person Visit with provider. Reference Visit Selection Guide.    Thank you for taking steps to prevent the spread of this virus.  o Limit your contact with others.  o Wear a simple mask to cover your cough.  o Wash your hands well and often.    Resources    [facility]: About COVID-19: [email or web address]    CDC: What to Do If You're Sick: www.cdc.gov/coronavirus/2019-ncov/about/steps-when-sick.html    CDC: Ending Home Isolation: www.cdc.gov/coronavirus/2019-ncov/hcp/disposition-in-home-patients.html     CDC: Caring for Someone: www.cdc.gov/coronavirus/2019-ncov/if-you-are-sick/care-for-someone.html     [facility]: Interim Guidance for Hospital Discharge to Home: www.health.[email or web address].[email or web address]/diseases/coronavirus/hcp/hospdischarge.pdf    [facility] clinical trials (COVID-19 research studies): [email or web address]-clinical-trials     Below are the COVID-19 hotlines at the [facility] of Health ([facility]). Interpreters are available.   o For health questions: Call 524-481-7796 or 1-254.829.4570 (7 a.m. to 7 p.m.)  o For questions about schools and childcare: Call 180-217-8493 or 1-700.497.3711 (7 a.m. to 7 p.m.)       Reason for Disposition    MILD pain that comes and goes (cramps) lasts > 24 hours    Additional Information    Negative: Passed out (i.e., fainted, collapsed and was not responding)     Negative: Shock suspected (e.g., cold/pale/clammy skin, too weak to stand, low BP, rapid pulse)    Negative: Sounds like a life-threatening emergency to the triager    Negative: SEVERE abdominal pain (e.g., excruciating)    Negative: Vomiting red blood or black (coffee ground) material    Negative: Bloody, black, or tarry bowel movements    Negative: Unable to urinate (or only a few drops) and bladder feels very full    Negative: Pain in scrotum persists > 1 hour    Negative: Constant abdominal pain lasting > 2 hours    Negative: Vomiting bile (green color)    Negative: Patient sounds very sick or weak to the triager    Negative: Vomiting and abdomen looks much more swollen than usual    Negative: White of the eyes have turned yellow (i.e., jaundice)    Negative: Blood in urine (red, pink, or tea-colored)    Negative: Fever > 103 F (39.4 C)    Negative: Fever > 101 F (38.3 C) and over 60 years of age    Negative: Fever > 100.0 F (37.8 C) and has diabetes mellitus or a weak immune system (e.g., HIV positive, cancer chemotherapy, organ transplant, splenectomy, chronic steroids)    Negative: Fever > 100.0 F (37.8 C) and bedridden (e.g., nursing home patient, stroke, chronic illness, recovering from surgery)    Protocols used: ABDOMINAL PAIN - MALE-A-[place]

## 2021-06-09 NOTE — PROGRESS NOTES
" ASSESSMENT AND PLAN:    1. Abdominal bloating  XR reveals normal gas pattern, mild constipation.  Likely irritable bowel and diverticulosis, exam is benign.  We discussed using metamucil daily, MOM prn, and exercise. Follow up if fails to improve.   - XR Abdomen 2 Views    2. Diverticular disease of colon    3. Constipation, unspecified constipation type    CHIEF COMPLAINT:  Chief Complaint   Patient presents with     Abdominal Pain     gas when not active     HISTORY OF PRESENT ILLNESS:  Storm Power is a 60 y.o. male with recent noted abdominal gaseousness and bloating.  No emesis, no diarrhea  Having daily small BM's.  No actual abdominal pain.  No fever, otherwise feels well.  History of diverticulitis in the past.  He does not feel sick.     REVIEW OF SYSTEMS:   See HPI, all other systems on review are negative.    Past Medical History:   Diagnosis Date     Hypertension      Social History     Tobacco Use   Smoking Status Current Some Day Smoker     Packs/day: 0.01     Types: Cigars   Smokeless Tobacco Never Used   Tobacco Comment    Socially, but does not purchase them     No family history on file.  No past surgical history on file.  VITALS:  Vitals:    06/25/20 1106   BP: 128/76   Patient Site: Left Arm   Patient Position: Sitting   Cuff Size: Adult Regular   Pulse: 60   SpO2: 98%   Weight: 193 lb (87.5 kg)   Height: 6' 3\" (1.905 m)     Wt Readings from Last 3 Encounters:   06/25/20 193 lb (87.5 kg)   11/13/19 200 lb (90.7 kg)   11/05/19 199 lb (90.3 kg)     PHYSICAL EXAM:  Constitutional:  In NAD, alert and oriented  Cardiac:  S1 S2   Lungs: Clear to auscultation  Abdomen:   Soft, flat and non-tender, without guarding, rebound, or mass.      DECISION TO OBTAIN OLD RECORDS AND/OR OBTAIN HISTORY FROM SOMEONE OTHER THAN PATIENT, AND/OR ACCESSING CARE EVERYWHERE):  1 0    REVIEW AND SUMMARIZATION OF OLD RECORDS, AND/OR OBTAINING HISTORY FROM SOMEONE OTHER THAN PATIENT, AND/OR DISCUSSION OF CASE WITH ANOTHER " HEALTH CARE PROVIDER:  2 0    REVIEW AND/OR ORDER OF OF CLINICAL LAB TESTS: 1  0.    REVIEW AND/OR ORDER OF RADIOLOGY TESTS: 1 ordered.    REVIEW AND/OR ORDER OF MEDICAL TESTS (EKG/ECHO/COLONOSCOPY/EGD): 1  0    INDEPENDENT  VISUALIZATION OF IMAGE, TRACING, OR SPECIMEN ITSELF (2 EACH):  Personally viewed and interpreted the abdominal xray and reviewed with the patient.      TOTAL: 3    Current Outpatient Medications   Medication Sig Dispense Refill     amLODIPine (NORVASC) 10 MG tablet Take 1 tablet (10 mg total) by mouth daily. 90 tablet 3     Manjinder Church MD  Internal Medicine  [facility]

## 2021-06-10 NOTE — PROGRESS NOTES
ASSESSMENT: Left plantar fibroma    PLAN: He is content to monitor this for now.  If it gets larger, we might consider orthotics, radiation therapy, or surgery as needed.      SUBJECTIVE: New patient visit at the [facility] regarding a lump he feels in the medial band of his left plantar fascia.  This has been present for about 2 years.  No known injury.  No break in the skin.  He usually cannot feel it with regular walking, but if he hits it just right, it can be painful.  He thinks it is getting a little smaller.  No symptoms in the other foot.  He stands and walks all day at work.    PHYSICAL EXAM:  General: Pleasant 57 y.o. male in no acute distress.  Vascular: DP pulses are palpable. PT pulses are palpable. Pedal hair is present. Feet are warm to the touch.  Cardiac: Pulse is regular.  Lymphatic: No edema at the ankles.  Neuro: Sensation in the feet is grossly intact to light touch.  Derm: No open lesions.  Musculoskeletal: Palpable fibrous prominence of the medial band of the left plantar fascia near the first metatarsal.  Somewhat tender with palpation today.  No secondary lesions elsewhere in the plantar fascia.    Past Medical History:   Diagnosis Date     Hypertension         has no past surgical history on file.    Allergies   Allergen Reactions     Ciprofloxacin        Current Outpatient Prescriptions   Medication Sig Dispense Refill     amLODIPine (NORVASC) 10 MG tablet TAKE 1 TABLET BY MOUTH EVERY DAY 90 tablet 1     ASPIRIN LOW DOSE ORAL Take 81 mg by mouth daily.       No current facility-administered medications for this visit.        Family History:  family history is not on file.    Social History:  Reviewed, and he reports that he has been smoking Cigars.  He has been smoking about 0.01 packs per day. He has never used smokeless tobacco. He reports that he drinks alcohol. He reports that he does not use illicit drugs.    Review of Systems:  A 12 point comprehensive review of systems  was negative except as noted.

## 2021-06-16 PROBLEM — Z98.890 S/P LEFT ROTATOR CUFF REPAIR: Status: ACTIVE | Noted: 2019-11-13

## 2021-06-16 NOTE — PROGRESS NOTES
Office Visit - Follow Up   Storm Power   58 y.o. male    Date of Visit: 3/7/2018    Chief Complaint   Patient presents with     Shoulder Pain     R shoulder pain, previous clavical injury about 25 years ago        Assessment and Plan   1. Chronic scapular pain  Has chronic right scapular and clavicular pain which he attributes to a fall 2025 years ago.  Apparently the right clavicle popped out from the sternum after  his fall. Since then suffers right clavicular and right sternal pains.  On exam there is bump on the proximal clavicle is not tender to palpation.  Referred to orthopedics for evaluation of his  orthopedic problem   - Ambulatory referral to Orthopedic Surgery    2. Right shoulder pain  Because of his clavicular and scapular pains he suffers from right shoulder pains.  Does not have tenderness of the right shoulder joint.  Has normal range of motion.  Doubt this is related to his chronic clavicular and scapular pains.  Suspect due to osteoarthritis of the right shoulder or even impingement syndrome.    3. Essential hypertension  Has hypertension controlled by amlodipine.  Continue amlodipine.    4. Anxiety  Used to have anxiety.  But now it is controlled.  LALITO 7 score is 0.    Advised to come back for his comprehensive physical exam.    Follow up  as needed.     History of Present Illness   This 58 y.o. old male  complains of right scapular, right  clavicular pains and right shoulder pains.  All this started when he fell 25 years ago and hit his right chest on the concrete.  Reports his right clavicle popped out from the right side of his sternum.  Since then he experienced these pains.  There is no limitation range of motion of the right shoulder and right upper extremity.  No definite tenderness on palpation of the involved joints.  But there is a bump on the right proximal clavicular joint just near the sternum suggesting presence of fibrosis or scarring.  Gets message mildly and chiropractor  "treatment every other month on the involved joints but his pains still persist.  Has hypertension well controlled by amlodipine alone.  Has anxiety now resolved.    Review of Systems   A 12 point comprehensive review of systems was negative except as noted..     Medications, Allergies and Problem List   Reviewed and updated             Chief Complaint   Shoulder Pain (R shoulder pain, previous clavical injury about 25 years ago)       Patient Profile   Social History     Social History Narrative        Past Medical History   Patient Active Problem List   Diagnosis     Anxiety     Hyperlipidemia     Essential Hypertension       Past Surgical History  He has no past surgical history on file.       Medications and Allergies   Current Outpatient Prescriptions   Medication Sig     amLODIPine (NORVASC) 10 MG tablet TAKE 1 TABLET BY MOUTH EVERY DAY     ASPIRIN LOW DOSE ORAL Take 81 mg by mouth daily.     Allergies   Allergen Reactions     Ciprofloxacin         Family and Social History   No family history on file.     Social History   Substance Use Topics     Smoking status: Current Some Day Smoker     Packs/day: 0.01     Types: Cigars     Smokeless tobacco: Never Used      Comment: Socially, but does not purchase them     Alcohol use Yes      Comment: occasional wine and beer        Physical Exam       Physical Exam  /80  Pulse 71  Ht 6' 1.75\" (1.873 m)  Wt 201 lb (91.2 kg)  SpO2 97%  BMI 25.98 kg/m2  General appearance: alert, appears stated age, cooperative and no distress  Head: Normocephalic, without obvious abnormality, atraumatic  Throat: lips, mucosa, and tongue normal; teeth and gums normal  Neck: no adenopathy, no carotid bruit, no JVD, supple, symmetrical, trachea midline and thyroid not enlarged, symmetric, no tenderness/mass/nodules  Back: symmetric, no curvature. ROM normal. No CVA tenderness.  Lungs: clear to auscultation bilaterally  Chest wall: no tenderness  Heart: regular rate and rhythm, S1, " S2 normal, no murmur, click, rub or gallop  Abdomen: soft, non-tender; bowel sounds normal; no masses,  no organomegaly  Extremities: extremities normal, atraumatic, no cyanosis or edema  Skin: Skin color, texture, turgor normal. No rashes or lesions  Neurologic: Grossly normal  Musculoskeletal: Presence of a bump or lump on the proximal clavicle near the right side of the sternum and nontender.  Normal right shoulder joint exam.     Additional Information        Manan Moreno MD  Internal Medicine  Contact me at [phone]     Additional Information   Current Outpatient Prescriptions   Medication Sig     amLODIPine (NORVASC) 10 MG tablet TAKE 1 TABLET BY MOUTH EVERY DAY     ASPIRIN LOW DOSE ORAL Take 81 mg by mouth daily.     Allergies   Allergen Reactions     Ciprofloxacin      Social History   Substance Use Topics     Smoking status: Current Some Day Smoker     Packs/day: 0.01     Types: Cigars     Smokeless tobacco: Never Used      Comment: Socially, but does not purchase them     Alcohol use Yes      Comment: occasional wine and beer         Time: total time spent with the patient was 25 minutes of which >50% was spent in counseling and coordination of care

## 2021-06-17 NOTE — PROGRESS NOTES
Preoperative Exam    Scheduled Procedure: R Rotator Cuff Repair   Surgery Date:  4/20/18  Surgery Location: [facility], [place], fax [phone]    Surgeon:  Dr. Hagen     Assessment/Plan:     1. Preop examination  Normal physical exam.  Okay to go ahead with his surgery.  No contraindications.  - Electrocardiogram Perform and Read    2. Rotator cuff tear, right  Has right rotator cuff tear.  Saw orthopedics.  Was recommended arthroscopic  surgical repair.    3. Essential hypertension  Controlled.  Will take amlodipine on the morning of his procedure with sips of water.  Will stop aspirin starting today.  - Basic Metabolic Panel  - HM2(CBC w/o Differential)     Surgical Procedure Risk: Low (reported cardiac risk generally < 1%)  Have you had prior anesthesia?: Yes  Have you or any family members had a previous anesthesia reaction:  No  Do you or any family members have a history of a clotting or bleeding disorder?: No  Cardiac Risk Assessment: no increased risk for major cardiac complications    Patient approved for surgery with general or local anesthesia.    Functional Status: Independent  Patient plans to recover at home with family.     Subjective:      Storm Power is a 58 y.o. male who presents for a preoperative consultation.  Came for his preop history and physical prior to his right rotator cuff arthroscopic surgery.  Suffers from right shoulder pain which was found to be due to torn rotator cuff.  Seen by orthopedics.  Was advised repair.  Scheduled on  4/20/2018 with Dr. Hagen.  Denies chest pains and shortness of breath.  Denies urinary or bowel disturbances.  Denies any other complaints.    All other systems reviewed and are negative, other than those listed in the HPI.    Pertinent History  Do you have difficulty breathing or chest pain after walking up a flight of stairs: No  History of obstructive sleep apnea: No  Steroid use in the last 6 months: No  Frequent  "Aspirin/NSAID use: No  Prior Blood Transfusion: No  Prior Blood Transfusion Reaction: No  If for some reason prior to, during or after the procedure, if it is medically indicated, would you be willing to have a blood transfusion?:  There is no transfusion refusal.    Current Outpatient Prescriptions   Medication Sig Dispense Refill     amLODIPine (NORVASC) 10 MG tablet TAKE 1 TABLET BY MOUTH EVERY DAY 90 tablet 1     ASPIRIN LOW DOSE ORAL Take 81 mg by mouth daily.       No current facility-administered medications for this visit.         Allergies   Allergen Reactions     Ciprofloxacin        Patient Active Problem List   Diagnosis     Anxiety     Hyperlipidemia     Essential Hypertension       Past Medical History:   Diagnosis Date     Hypertension        No past surgical history on file.    Social History     Social History     Marital status:      Spouse name: N/A     Number of children: N/A     Years of education: N/A     Occupational History     Not on file.     Social History Main Topics     Smoking status: Current Some Day Smoker     Packs/day: 0.01     Types: Cigars     Smokeless tobacco: Never Used      Comment: Socially, but does not purchase them     Alcohol use Yes      Comment: occasional wine and beer     Drug use: No     Sexual activity: Not on file     Other Topics Concern     Not on file     Social History Narrative       Patient Care Team:  Manan Moreno MD as PCP - General          Objective:     Vitals:    04/18/18 1506   BP: 126/80   Pulse: (!) 55   SpO2: 98%   Weight: 211 lb (95.7 kg)   Height: 6' 1.75\" (1.873 m)         Physical Exam:  /80  Pulse (!) 55  Ht 6' 1.75\" (1.873 m)  Wt 211 lb (95.7 kg)  SpO2 98%  BMI 27.27 kg/m2    General Appearance:    Alert, cooperative, no distress, appears stated age, pleasant   Head:    Normocephalic, without obvious abnormality, atraumatic   Eyes:    PERRL, conjunctiva/corneas clear, EOM's intact, fundi     benign, both eyes      "   Ears:    Normal TM's and external ear canals, both ears   Nose:   Nares normal, septum midline, mucosa normal, no drainage    or sinus tenderness   Throat:   Lips, mucosa, and tongue normal; teeth and gums normal   Neck:   Supple, symmetrical, trachea midline, no adenopathy;        thyroid:  No enlargement/tenderness/nodules; no carotid    bruit or JVD   Back:     Symmetric, no curvature, ROM normal, no CVA tenderness   Lungs:     Clear to auscultation bilaterally, respirations unlabored   Chest wall:    No tenderness or deformity   Heart:    Regular rate and rhythm, S1 and S2 normal, no murmur, rub    or gallop   Rectal:    Normal tone, normal prostate, no masses or tenderness;    guaiac negative stool   Extremities:   Extremities normal, atraumatic, no cyanosis or edema   Pulses:   2+ and symmetric all extremities   Skin:   Skin color, texture, turgor normal, no rashes or lesions   Lymph nodes:   Cervical, supraclavicular, and axillary nodes normal   Neurologic:   CNII-XII intact. Normal strength, sensation and reflexes       throughout       There are no Patient Instructions on file for this visit.    EKG: Sinus bradycardia, nonspecific T-wave changes.  Otherwise within normal limits    Labs:  Pending labs are CBC and basic metabolic panel.      There is no immunization history on file for this patient.        Electronically signed by Manan Moreno MD 04/18/18 3:11 PM

## 2021-06-17 NOTE — PROGRESS NOTES
Office Visit - Follow Up   Storm Power   61 y.o. male    Date of Visit: 5/20/2021    Chief Complaint   Patient presents with     Follow-up     Medication refills.         Assessment and Plan   1. Essential hypertension  Controlled. Continue amlodipine.   - amLODIPine (NORVASC) 10 MG tablet; Take 1 tablet (10 mg total) by mouth daily.  Dispense: 90 tablet; Refill: 3    2. Mixed hyperlipidemia  Controlled without need for a medication. Last lipids were good.       Follow up in 6 months.      History of Present Illness   This 61 y.o. old male is here for follow up. Doing and feeling well. Has hypertension controlled by amlodipine. Has hyperlipidemia controlled without need for a medication. Has lost weight which helped control his lipids and blood pressure. Does not have complaints.      Review of Systems   A 12 point comprehensive review of systems was negative except as noted..     Medications, Allergies and Problem List   Reviewed and updated             Chief Complaint   Follow-up (Medication refills. )       Patient Profile   Social History     Social History Narrative    , 2 grown children, a daughter and a son. Smokes 1-3 cigars a week. Drinks glass or 2 of beer or wine 3 times a week. Works for [employer].        Past Medical History   Patient Active Problem List   Diagnosis     Anxiety     Hyperlipidemia     Essential Hypertension     S/P left rotator cuff repair, 9/17/19     Diverticular disease of colon       Past Surgical History  He has no past surgical history on file.       Medications and Allergies   Current Outpatient Medications   Medication Sig     amLODIPine (NORVASC) 10 MG tablet Take 1 tablet (10 mg total) by mouth daily.     Allergies   Allergen Reactions     Ciprofloxacin         Family and Social History   No family history on file.     Social History     Tobacco Use     Smoking status: Current Some Day Smoker     Packs/day: 0.01     Types: Pipe     Smokeless tobacco: Never Used  "    Tobacco comment: Socially, but does not purchase them   Substance Use Topics     Alcohol use: Yes     Comment: occasional wine and beer     Drug use: No        Physical Exam       Physical Exam  /62   Pulse 68   Ht 6' 3\" (1.905 m)   Wt 194 lb (88 kg)   SpO2 97%   BMI 24.25 kg/m    General appearance: alert, appears stated age, cooperative and no distress  Throat: lips, mucosa, and tongue normal; teeth and gums normal  Neck: no adenopathy, no carotid bruit, no JVD, supple, symmetrical, trachea midline and thyroid not enlarged, symmetric, no tenderness/mass/nodules  Lungs: clear to auscultation bilaterally  Heart: regular rate and rhythm, S1, S2 normal, no murmur, click, rub or gallop  Abdomen: soft, non-tender; bowel sounds normal; no masses,  no organomegaly  Extremities: extremities normal, atraumatic, no cyanosis or edema  Skin: Skin color, texture, turgor normal. No rashes or lesions     Additional Information   Post Discharge Medication Reconciliation Status: discharge medications reconciled, continue medications without change      Manan Moreno MD  Internal Medicine  Contact me at [phone]     Additional Information   Current Outpatient Medications   Medication Sig     amLODIPine (NORVASC) 10 MG tablet Take 1 tablet (10 mg total) by mouth daily.     Allergies   Allergen Reactions     Ciprofloxacin      Social History     Tobacco Use     Smoking status: Current Some Day Smoker     Packs/day: 0.01     Types: Pipe     Smokeless tobacco: Never Used     Tobacco comment: Socially, but does not purchase them   Substance Use Topics     Alcohol use: Yes     Comment: occasional wine and beer     Drug use: No              "

## 2021-06-19 NOTE — LETTER
Letter by Manan Moreno MD at      Author: Manan Moreno MD Service: -- Author Type: --    Filed:  Encounter Date: 11/18/2019 Status: Signed         Storm EDSON Power  [id number] [place] [address]             November 18, 2019         Dear Mr. Power,    Below are the results from your recent visit:    Resulted Orders   HM2(CBC w/o Differential)   Result Value Ref Range    WBC 6.9 4.0 - 11.0 thou/uL    RBC 4.72 4.40 - 6.20 mill/uL    Hemoglobin 14.8 14.0 - 18.0 g/dL    Hematocrit 44.5 40.0 - 54.0 %    MCV 94 80 - 100 fL    MCH 31.4 27.0 - 34.0 pg    MCHC 33.4 32.0 - 36.0 g/dL    RDW 12.8 11.0 - 14.5 %    Platelets 301 140 - 440 thou/uL    MPV 8.7 7.0 - 10.0 fL   Basic Metabolic Panel   Result Value Ref Range    Sodium 144 136 - 145 mmol/L    Potassium 4.0 3.5 - 5.0 mmol/L    Chloride 108 (H) 98 - 107 mmol/L    CO2 24 22 - 31 mmol/L    Anion Gap, Calculation 12 5 - 18 mmol/L    Glucose 94 70 - 125 mg/dL    Calcium 10.0 8.5 - 10.5 mg/dL    BUN 12 8 - 22 mg/dL    Creatinine 1.02 0.70 - 1.30 mg/dL    GFR MDRD Af Amer >60 >60 mL/min/1.73m2    GFR MDRD Non Af Amer >60 >60 mL/min/1.73m2    Narrative    Fasting Glucose reference range is 70-99 mg/dL per  American Diabetes Association (ADA) guidelines.   Hepatic Profile   Result Value Ref Range    Bilirubin, Total 0.6 0.0 - 1.0 mg/dL    Bilirubin, Direct 0.2 <=0.5 mg/dL    Protein, Total 7.6 6.0 - 8.0 g/dL    Albumin 4.4 3.5 - 5.0 g/dL    Alkaline Phosphatase 75 45 - 120 U/L    AST 30 0 - 40 U/L    ALT 60 (H) 0 - 45 U/L   Urinalysis-UC if Indicated   Result Value Ref Range    Color, UA Yellow Colorless, Yellow, Straw, Light Yellow    Clarity, UA Clear Clear    Glucose, UA Negative Negative    Bilirubin, UA Negative Negative    Ketones, UA Negative Negative    Specific Gravity, UA 1.025 1.005 - 1.030    Blood, UA Small (!) Negative    pH, UA 5.5 5.0 - 8.0    Protein, UA Negative Negative mg/dL    Urobilinogen, UA 0.2 E.U./dL 0.2 E.U./dL, 1.0 E.U./dL    Nitrite, UA  Negative Negative    Leukocytes, UA Negative Negative    Bacteria, UA None Seen None Seen hpf    RBC, UA None Seen None Seen, 0-2 hpf    WBC, UA None Seen None Seen, 0-5 hpf    Squam Epithel, UA None Seen None Seen, 0-5 lpf    Mucus, UA Few (!) None Seen lpf    Narrative    UC not indicated   PSA (Prostatic-Specific Antigen), Annual Screen   Result Value Ref Range    PSA 1.2 0.0 - 3.5 ng/mL    Narrative    Method is Abbott Prostate-Specific Antigen (PSA)  Standard-WHO 1st International (90:10)       Normal all labs specifically your white cell count and PSA.  Thanks.    Please call with questions or contact us using [employer].    Sincerely,        Electronically signed by Manan Moreno MD

## 2021-06-19 NOTE — LETTER
Letter by Manan Moreno MD at      Author: Manan Moreno MD Service: -- Author Type: --    Filed:  Encounter Date: 11/20/2019 Status: Signed         Storm Power  [id number] [place] [address]             November 20, 2019         Dear Mr. Power,    Below are the results from your recent visit:    Resulted Orders   Lipid Cascade   Result Value Ref Range    Cholesterol 214 (H) <=199 mg/dL    Triglycerides 98 <=149 mg/dL    HDL Cholesterol 53 >=40 mg/dL    LDL Calculated 141 (H) <=129 mg/dL    Patient Fasting > 8hrs? Yes        Increased lipids specifically LDL and total cholesterol than previously.  Will treat with low-fat diet or low-cholesterol diet first and have your  fasting lipids repeated in 3 months as lab appointment.  Lipids are still increased you will need a lipid-lowering medication.    Thank you.    Please call with questions or contact us using [employer].    Sincerely,        Electronically signed by Manan Moreno MD

## 2021-06-19 NOTE — LETTER
Letter by Anatoliy Wan MD at      Author: Anatoliy Wan MD Service: -- Author Type: --    Filed:  Encounter Date: 8/29/2019 Status: (Other)       August 29, 2019    To whom it may concern:    Storm Power was seen in the office today for severe left shoulder pain.  Has been seen by orthopedic surgeon and diagnosed with a full-thickness rotator cuff tear.  Surgery is planned.  Per discussion of the orthopedic surgeons recommendation he is unable to do any lifting at work.  He is therefore disabled beginning today August 29 through his surgical date which is scheduled for September 17, 2019.  Further disability will be determined by his orthopedic surgeon.    Patient's primary physician is Dr. Manan Moreno and further discussion or questions should be directed to him as necessary.        Sincerely-Anatoliy Wan MD    Re: Storm Power  Date of birth 1960     [place]

## 2021-06-19 NOTE — LETTER
Letter by Manan Moreno MD at      Author: Manan Moreno MD Service: -- Author Type: --    Filed:  Encounter Date: 11/6/2019 Status: Signed         Storm Power  [address] [place] [id number]             November 6, 2019         Dear Mr. Power,    Below are the results from your recent visit:    Resulted Orders   HM2(CBC w/o Differential)   Result Value Ref Range    WBC 10.0 4.0 - 11.0 thou/uL    RBC 4.52 4.40 - 6.20 mill/uL    Hemoglobin 14.2 14.0 - 18.0 g/dL    Hematocrit 41.8 40.0 - 54.0 %    MCV 93 80 - 100 fL    MCH 31.4 27.0 - 34.0 pg    MCHC 34.0 32.0 - 36.0 g/dL    RDW 13.5 11.0 - 14.5 %    Platelets 226 140 - 440 thou/uL    MPV 8.7 7.0 - 10.0 fL       Normal white count.  No zenia infection.  Continue and finish antibiotic.  Thanks.    Please call with questions or contact us using [employer].    Sincerely,        Electronically signed by Manan Moreno MD

## 2021-06-20 NOTE — LETTER
Letter by Andrew Shay MD at      Author: Andrew Shay MD Service: -- Author Type: --    Filed:  Encounter Date: 2/17/2020 Status: (Other)         Storm Power  [address] [place] [id number]             February 17, 2020         Dear Mr. Power,    Below are the results from your recent visit:    Resulted Orders   Lipid Cascade   Result Value Ref Range    Cholesterol 225 (H) <=199 mg/dL    Triglycerides 73 <=149 mg/dL    HDL Cholesterol 63 >=40 mg/dL    LDL Calculated 147 (H) <=129 mg/dL    Patient Fasting > 8hrs? Yes        Your cholesterol numbers are about the same.    Please call with questions or contact us using [employer].    Sincerely,        Electronically signed by Andrew Shay MD

## 2021-06-21 NOTE — PROGRESS NOTES
1. External hemorrhoids          Plan: I recommended that he get some over-the-counter hemorrhoid cream, such as Preparation H or Anusol.  He can use that a couple of times a day as needed.  I suggested that he keep his stools on the softer side as well.  This biking thing may be contributing to some of it as well, so I asked him if he could possibly limit or pull back a little bit on his biking exercise and he will consider doing that.  He could also do some sitz baths and could also look into some wet wipes or something similar for his hygiene.  He will let us know he is any worse instead of better.    Subjective: 58-year-old male who is here today with concerns about some rectal bleeding.  He says that it started about 3 days ago, and he noticed that with wiping after a bowel movement.  He does not really think that he has any in between bowel movements.  He sometimes notices some red blood on the stool but that is not as often.  He works out on his bike a lot, up to an hour a day and he wonders if that is contributing to this.  He has no pain with his bowel movements.  He sometimes gets a little constipated and he does note that that is a bit worse.  He has not palpated any lumps near his rectal area.  The bleeding is always bright red and never dark or melenic.    Objective: Well-appearing male in no acute distress.  Vital signs as noted.  Chest clear.  Heart regular rate and rhythm.  Abdomen soft nontender nondistended bowel sounds present all quadrants.  Rectal exam shows some small hemorrhoidal tags but otherwise unremarkable exam.

## 2021-06-23 NOTE — PROGRESS NOTES
Office Visit - Follow Up   Storm Power   58 y.o. male    Date of Visit: 1/10/2019    Chief Complaint   Patient presents with     Cough     yellow colored phelgm with cough x2 days, needs note for work off today till next Tuesday.        Assessment and Plan   1. Fever, unspecified fever cause  Has fever low-grade since yesterday.  Cough and colds.  Did not get a flu shot because he refused when it was offered.  Did his rapid influenza A and B test and this was negative for influenza.  Informed patient of this result.  - Influenza A/B Rapid Test- Nasal Swab    2. Cough  Has been coughing with some productive of mucoid phlegm.  Also has runny nose.  Going on for  a day.  Feels rundown and tired.  Wants to sleep  and rest.  Does not have joint or muscle aches and pains.  Does not have loose stool or diarrhea.    3. Viral URI  Inform patient he has more of a viral URI.  Needs to rest and take it easy for the next  4-5 days days.  Encourage to increase fluid intake.  Work statement was provided excusing him from his work starting today until mid next week.  Will return to work January 16, 2018      Follow up as needed.     History of Present Illness   This 58 y.o. old male called our clinic wanted to be seen.  Feels rundown and tired.  Has low-grade fever at home.  Has been coughing and having runny nose for 1 day.  Wants to sleep and rest.  Went to work for 2 hours but went home because he felt weak and tired.  Denies diarrhea or loose stools.  Does not have aches and pains.  But  feels weak during this visit.  Not short of breath.  Has low-grade temperature in the clinic.  Coughing intermittently here.  He did get a flu shot.    Review of Systems   A 12 point comprehensive review of systems was negative except as noted..     Medications, Allergies and Problem List   Reviewed and updated             Chief Complaint   Cough (yellow colored phelgm with cough x2 days, needs note for work off today till next Tuesday.)    "    Patient Profile   Social History     Social History Narrative    , 2 grown children, a daughter and a son. Smokes 1-3 cigars a week. Drinks 5 beers a week. Works for [email or web address].        Past Medical History   Patient Active Problem List   Diagnosis     Anxiety     Hyperlipidemia     Essential Hypertension       Past Surgical History  He has no past surgical history on file.       Medications and Allergies   Current Outpatient Medications   Medication Sig     amLODIPine (NORVASC) 10 MG tablet Take 1 tablet (10 mg total) by mouth daily.     amLODIPine (NORVASC) 10 MG tablet TAKE 1 TABLET BY MOUTH ONCE A DAY     ASPIRIN LOW DOSE ORAL Take 81 mg by mouth daily.     Allergies   Allergen Reactions     Ciprofloxacin         Family and Social History   No family history on file.     Social History     Tobacco Use     Smoking status: Current Some Day Smoker     Packs/day: 0.01     Types: Cigars     Smokeless tobacco: Never Used     Tobacco comment: Socially, but does not purchase them   Substance Use Topics     Alcohol use: Yes     Comment: occasional wine and beer     Drug use: No        Physical Exam       Physical Exam  /70   Pulse 81   Temp 100.1  F (37.8  C)   Ht 6' 1.75\" (1.873 m)   Wt 201 lb (91.2 kg)   SpO2 97%   BMI 25.98 kg/m    General appearance: alert, appears stated age, cooperative, no distress and weak and tired looking  Head: Normocephalic, without obvious abnormality, atraumatic  Ears: normal TM's and external ear canals both ears  Nose: Nares normal. Septum midline. Mucosa normal. No drainage or sinus tenderness.  Throat: lips, mucosa, and tongue normal; teeth and gums normal  Neck: no adenopathy, no carotid bruit, no JVD, supple, symmetrical, trachea midline and thyroid not enlarged, symmetric, no tenderness/mass/nodules  Lungs: clear to auscultation bilaterally  Heart: regular rate and rhythm, S1, S2 normal, no murmur, click, rub or gallop  Abdomen: soft, non-tender; bowel sounds " normal; no masses,  no organomegaly  Extremities: extremities normal, atraumatic, no cyanosis or edema     Additional Information        Manan Moreno MD  Internal Medicine  Contact me at [phone]     Additional Information   Current Outpatient Medications   Medication Sig     amLODIPine (NORVASC) 10 MG tablet Take 1 tablet (10 mg total) by mouth daily.     amLODIPine (NORVASC) 10 MG tablet TAKE 1 TABLET BY MOUTH ONCE A DAY     ASPIRIN LOW DOSE ORAL Take 81 mg by mouth daily.     Allergies   Allergen Reactions     Ciprofloxacin      Social History     Tobacco Use     Smoking status: Current Some Day Smoker     Packs/day: 0.01     Types: Cigars     Smokeless tobacco: Never Used     Tobacco comment: Socially, but does not purchase them   Substance Use Topics     Alcohol use: Yes     Comment: occasional wine and beer     Drug use: No         Time: total time spent with the patient was 25 minutes of which >50% was spent in counseling and coordination of care

## 2021-06-23 NOTE — TELEPHONE ENCOUNTER
Spoke with the patient and relayed message below from Dr. Moreno.  Patient verbalized understanding and is scheduled to see Dr. Moreno this afternoon at 2:40 pm.      Andra WEEMS CMA/KATY....................12:56 PM

## 2021-06-23 NOTE — TELEPHONE ENCOUNTER
Ask him to see me this pm to get a note. Cannot write a note without seeing the patient, new rule.

## 2021-06-23 NOTE — TELEPHONE ENCOUNTER
The patient has a URI and needs to go home and rest. Needs to be home sick tomorrow also. Due to his work sick policy he needs to have a note from his doctor. Would you be willing to write a note or do you need to see him?    If willing to write a note the fax number is [phone] Attn Patrick Kline    Symptoms started 2 days ago    Nasal congestion    Cough    Slight fever    No problems with breathing    No body aches    No sore throat    No ear ache    Liss Handley, RN  [facility] Medication Refill and Triage Nurse  1/10/2019  9:23 AM      Reason for Disposition    Colds with no complications    Protocols used: COMMON COLD-A-OH

## 2021-07-29 ENCOUNTER — OFFICE VISIT (OUTPATIENT)
Dept: INTERNAL MEDICINE | Facility: CLINIC | Age: 61
End: 2021-07-29
Payer: COMMERCIAL

## 2021-07-29 VITALS
HEART RATE: 69 BPM | SYSTOLIC BLOOD PRESSURE: 128 MMHG | OXYGEN SATURATION: 98 % | BODY MASS INDEX: 24.25 KG/M2 | DIASTOLIC BLOOD PRESSURE: 68 MMHG | WEIGHT: 194 LBS

## 2021-07-29 DIAGNOSIS — E78.2 MIXED HYPERLIPIDEMIA: ICD-10-CM

## 2021-07-29 DIAGNOSIS — M54.2 NECK PAIN: Primary | ICD-10-CM

## 2021-07-29 DIAGNOSIS — I10 ESSENTIAL HYPERTENSION: ICD-10-CM

## 2021-07-29 PROCEDURE — 99214 OFFICE O/P EST MOD 30 MIN: CPT | Performed by: INTERNAL MEDICINE

## 2021-07-29 RX ORDER — CELECOXIB 100 MG/1
200 CAPSULE ORAL 2 TIMES DAILY
Qty: 30 CAPSULE | Refills: 0 | Status: SHIPPED | OUTPATIENT
Start: 2021-07-29

## 2021-07-29 NOTE — PROGRESS NOTES
Assessment & Plan     Neck pain  Complains of recurrence of his neck pains. Found in the past to have degenerative disc disease of his cervical spine. Already seeing [facility] for his shoulder pains. Will be getting cortisone shot to his left shoulder next week. Advised to have neck pains addressed by a neck specialist in their group. Will send referral. Will prescribe celecoxib to try while waiting to see ortho.   - Spine Referral; Future  - celecoxib (CELEBREX) 100 MG capsule; Take 2 capsules (200 mg) by mouth 2 times daily    Essential hypertension  Controlled. Continue amlodipine.   - Basic metabolic panel  (Ca, Cl, CO2, Creat, Gluc, K, Na, BUN); Future    Mixed hyperlipidemia  Controlled without need for a medication. Due for repeat fasting lipids. Will come back for fasting labs in 2 weeks.   - Hepatic panel (Albumin, ALT, AST, Bili, Alk Phos, TP); Future  - Lipid Profile (Chol, Trig, HDL, LDL calc); Future          Tobacco Cessation:   reports that he has been smoking pipe. He has been smoking about 0.01 packs per day. He has never used smokeless tobacco.      FUTURE APPOINTMENTS:       - Follow-up visit in 6 months.         SUKI CRESPO MD  [facility]    Tenisha Inman is a 61 year old who presents for the following health issues     HPI   Follow up visit for Storm. Complains of recurrence of his neck pains. Found to have degenerative disc disease of his cervical spine in the past. Had neck pains which improved with cortisone shot. Currently following with ortho for his shoulder pains. Scheduled to get cortisone shot to his left shoulder next week. Has hypertension controlled by amlodipine. Has hyperlipidemia controlled without need for a medication yet. But he is overdue for repeat fasting lipids. Overall, feels well.     Hypertension Follow-up      Do you check your blood pressure regularly outside of the clinic? No     Are you following a low salt diet? No    Are your  blood pressures ever more than 140 on the top number (systolic) OR more   than 90 on the bottom number (diastolic), for example 140/90? No      How many servings of fruits and vegetables do you eat daily?  0-1    On average, how many sweetened beverages do you drink each day (Examples: soda, juice, sweet tea, etc.  Do NOT count diet or artificially sweetened beverages)?   2    How many days per week do you exercise enough to make your heart beat faster? 3 or less    How many minutes a day do you exercise enough to make your heart beat faster? 20 - 29    How many days per week do you miss taking your medication? 0        Review of Systems   Constitutional, HEENT, cardiovascular, pulmonary, GI, , musculoskeletal, neuro, skin, endocrine and psych systems are negative, except as otherwise noted.      Objective    /68   Pulse 69   Wt 88 kg (194 lb)   SpO2 98%   BMI 24.25 kg/m    Body mass index is 24.25 kg/m .  Physical Exam   GENERAL APPEARANCE: healthy, alert and no distress  EYES: Eyes grossly normal to inspection, PERRL and conjunctivae and sclerae normal  RESP: lungs clear to auscultation - no rales, rhonchi or wheezes  CV: regular rates and rhythm, normal S1 S2, no S3 or S4 and no murmur, click or rub  ABDOMEN: soft, nontender, without hepatosplenomegaly or masses and bowel sounds normal  MS: extremities normal- no gross deformities noted  ORTHO: tender neck with normal range of motion  SKIN: no suspicious lesions or rashes

## 2021-08-02 ENCOUNTER — TRANSFERRED RECORDS (OUTPATIENT)
Dept: HEALTH INFORMATION MANAGEMENT | Facility: CLINIC | Age: 61
End: 2021-08-02

## 2021-08-24 ENCOUNTER — TRANSFERRED RECORDS (OUTPATIENT)
Dept: HEALTH INFORMATION MANAGEMENT | Facility: CLINIC | Age: 61
End: 2021-08-24

## 2021-09-21 ENCOUNTER — TRANSFERRED RECORDS (OUTPATIENT)
Dept: HEALTH INFORMATION MANAGEMENT | Facility: CLINIC | Age: 61
End: 2021-09-21

## 2022-01-18 VITALS
HEIGHT: 75 IN | HEART RATE: 60 BPM | WEIGHT: 193 LBS | BODY MASS INDEX: 24 KG/M2 | SYSTOLIC BLOOD PRESSURE: 128 MMHG | OXYGEN SATURATION: 98 % | DIASTOLIC BLOOD PRESSURE: 76 MMHG

## 2022-01-18 VITALS
HEART RATE: 68 BPM | BODY MASS INDEX: 24.12 KG/M2 | WEIGHT: 194 LBS | HEIGHT: 75 IN | SYSTOLIC BLOOD PRESSURE: 124 MMHG | OXYGEN SATURATION: 97 % | DIASTOLIC BLOOD PRESSURE: 62 MMHG

## 2024-11-18 NOTE — TELEPHONE ENCOUNTER
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    HE WANTS TO SCHEDULE NOW FOR MAY 15. THAT WILL BE 2 WEEKS PAST EXPOSURE.    Patient is calling requesting COVID serologic antibody testing.  NOTE: Serologic testing is a blood test for 'antibodies' which are made at 10-14 days after you have had symptoms of COVID or were exposed and had an asymptomatic infection.  This does NOT test you for 'active' infection or tell you if you are contagious.    Are you a healthcare worker?  No  Do you have cough, fever, myalgias, or shortness of breath?  No  Were you exposed to a lab confirmed positive or possible case of COVID-19?  Confirmed exposure 5 days ago.  Confirmed exposure < 14 days ago.  Recommend they wait for testing until it has been 14 days as it can take 2 weeks after exposure for the test to be positive.    The patient was informed: \"Testing is limited each day and it may take time for testing to be available to everyone who has called.  We will be calling you to schedule testing- please confirm the best number to reach you is [phone].\"    Lab order placed per COVID Serologic Testing standing orders.    Nano Ratliff RN  [facility] Nurse Advisor      " Medications